# Patient Record
Sex: FEMALE | Race: WHITE | NOT HISPANIC OR LATINO | Employment: OTHER | URBAN - METROPOLITAN AREA
[De-identification: names, ages, dates, MRNs, and addresses within clinical notes are randomized per-mention and may not be internally consistent; named-entity substitution may affect disease eponyms.]

---

## 2024-07-15 ENCOUNTER — HOSPITAL ENCOUNTER (OUTPATIENT)
Dept: RADIOLOGY | Facility: HOSPITAL | Age: 49
Discharge: HOME | End: 2024-07-15
Payer: MEDICARE

## 2024-07-15 DIAGNOSIS — M79.644 PAIN IN RIGHT FINGER(S): ICD-10-CM

## 2024-07-15 PROCEDURE — 73130 X-RAY EXAM OF HAND: CPT | Mod: RIGHT SIDE | Performed by: RADIOLOGY

## 2024-07-15 PROCEDURE — 73140 X-RAY EXAM OF FINGER(S): CPT | Mod: RT

## 2024-07-15 PROCEDURE — 73140 X-RAY EXAM OF FINGER(S): CPT | Mod: RIGHT SIDE | Performed by: RADIOLOGY

## 2024-07-15 PROCEDURE — 73130 X-RAY EXAM OF HAND: CPT | Mod: RT

## 2024-07-19 ENCOUNTER — HOSPITAL ENCOUNTER (OUTPATIENT)
Dept: RADIOLOGY | Facility: HOSPITAL | Age: 49
Discharge: HOME | End: 2024-07-19
Payer: MEDICARE

## 2024-07-19 DIAGNOSIS — N18.2 CHRONIC KIDNEY DISEASE, STAGE 2 (MILD): ICD-10-CM

## 2024-07-19 PROCEDURE — 76770 US EXAM ABDO BACK WALL COMP: CPT

## 2024-09-06 ENCOUNTER — LAB (OUTPATIENT)
Dept: LAB | Facility: LAB | Age: 49
End: 2024-09-06
Payer: MEDICARE

## 2024-09-06 DIAGNOSIS — I10 ESSENTIAL (PRIMARY) HYPERTENSION: Primary | ICD-10-CM

## 2024-09-06 LAB
ALBUMIN SERPL BCP-MCNC: 4 G/DL (ref 3.4–5)
ANION GAP SERPL CALC-SCNC: 12 MMOL/L (ref 10–20)
APPEARANCE UR: CLEAR
BILIRUB UR STRIP.AUTO-MCNC: NEGATIVE MG/DL
BUN SERPL-MCNC: 26 MG/DL (ref 6–23)
CALCIUM SERPL-MCNC: 8.8 MG/DL (ref 8.6–10.3)
CHLORIDE SERPL-SCNC: 103 MMOL/L (ref 98–107)
CO2 SERPL-SCNC: 27 MMOL/L (ref 21–32)
COLOR UR: YELLOW
CREAT SERPL-MCNC: 1.02 MG/DL (ref 0.5–1.05)
CREAT UR-MCNC: 153.5 MG/DL (ref 20–320)
EGFRCR SERPLBLD CKD-EPI 2021: 67 ML/MIN/1.73M*2
GLUCOSE SERPL-MCNC: 82 MG/DL (ref 74–99)
GLUCOSE UR STRIP.AUTO-MCNC: NORMAL MG/DL
KETONES UR STRIP.AUTO-MCNC: NEGATIVE MG/DL
LEUKOCYTE ESTERASE UR QL STRIP.AUTO: NEGATIVE
NITRITE UR QL STRIP.AUTO: NEGATIVE
PH UR STRIP.AUTO: 5.5 [PH]
PHOSPHATE SERPL-MCNC: 4.8 MG/DL (ref 2.5–4.9)
POTASSIUM SERPL-SCNC: 4.5 MMOL/L (ref 3.5–5.3)
PROT UR STRIP.AUTO-MCNC: NEGATIVE MG/DL
PROT UR-ACNC: 13 MG/DL (ref 5–24)
PROT/CREAT UR: 0.08 MG/MG CREAT (ref 0–0.17)
RBC # UR STRIP.AUTO: NEGATIVE /UL
SODIUM SERPL-SCNC: 137 MMOL/L (ref 136–145)
SP GR UR STRIP.AUTO: 1.02
URATE SERPL-MCNC: 4.9 MG/DL (ref 2.3–6.7)
UROBILINOGEN UR STRIP.AUTO-MCNC: NORMAL MG/DL

## 2024-09-06 PROCEDURE — 84165 PROTEIN E-PHORESIS SERUM: CPT

## 2024-09-06 PROCEDURE — 80069 RENAL FUNCTION PANEL: CPT

## 2024-09-06 PROCEDURE — 86038 ANTINUCLEAR ANTIBODIES: CPT

## 2024-09-06 PROCEDURE — 84165 PROTEIN E-PHORESIS SERUM: CPT | Performed by: INTERNAL MEDICINE

## 2024-09-06 PROCEDURE — 86160 COMPLEMENT ANTIGEN: CPT

## 2024-09-06 PROCEDURE — 81003 URINALYSIS AUTO W/O SCOPE: CPT

## 2024-09-06 PROCEDURE — 84156 ASSAY OF PROTEIN URINE: CPT

## 2024-09-06 PROCEDURE — 36415 COLL VENOUS BLD VENIPUNCTURE: CPT

## 2024-09-06 PROCEDURE — 84550 ASSAY OF BLOOD/URIC ACID: CPT

## 2024-09-06 PROCEDURE — 84155 ASSAY OF PROTEIN SERUM: CPT

## 2024-09-06 PROCEDURE — 82570 ASSAY OF URINE CREATININE: CPT

## 2024-09-07 LAB
C3 SERPL-MCNC: 112 MG/DL (ref 87–200)
C4 SERPL-MCNC: 27 MG/DL (ref 10–50)
HOLD SPECIMEN: NORMAL
PROT SERPL-MCNC: 6.6 G/DL (ref 6.4–8.2)

## 2024-09-09 LAB — ANA SER QL HEP2 SUBST: NEGATIVE

## 2024-09-11 LAB
ALBUMIN: 4.1 G/DL (ref 3.4–5)
ALPHA 1 GLOBULIN: 0.3 G/DL (ref 0.2–0.6)
ALPHA 2 GLOBULIN: 0.6 G/DL (ref 0.4–1.1)
BETA GLOBULIN: 0.8 G/DL (ref 0.5–1.2)
GAMMA GLOBULIN: 0.8 G/DL (ref 0.5–1.4)
PATH REVIEW-SERUM PROTEIN ELECTROPHORESIS: NORMAL
PROTEIN ELECTROPHORESIS COMMENT: NORMAL

## 2024-10-08 ENCOUNTER — APPOINTMENT (OUTPATIENT)
Dept: RADIOLOGY | Facility: HOSPITAL | Age: 49
End: 2024-10-08
Payer: MEDICARE

## 2024-10-16 ENCOUNTER — HOSPITAL ENCOUNTER (EMERGENCY)
Facility: HOSPITAL | Age: 49
Discharge: HOME | End: 2024-10-16
Attending: EMERGENCY MEDICINE
Payer: MEDICARE

## 2024-10-16 ENCOUNTER — APPOINTMENT (OUTPATIENT)
Dept: RADIOLOGY | Facility: HOSPITAL | Age: 49
End: 2024-10-16
Payer: MEDICARE

## 2024-10-16 VITALS
SYSTOLIC BLOOD PRESSURE: 118 MMHG | RESPIRATION RATE: 18 BRPM | HEIGHT: 67 IN | WEIGHT: 168 LBS | DIASTOLIC BLOOD PRESSURE: 89 MMHG | OXYGEN SATURATION: 98 % | HEART RATE: 88 BPM | BODY MASS INDEX: 26.37 KG/M2 | TEMPERATURE: 97.7 F

## 2024-10-16 DIAGNOSIS — K20.90 ESOPHAGITIS: Primary | ICD-10-CM

## 2024-10-16 DIAGNOSIS — R10.10 PAIN OF UPPER ABDOMEN: ICD-10-CM

## 2024-10-16 LAB
ALBUMIN SERPL BCP-MCNC: 4.4 G/DL (ref 3.4–5)
ALP SERPL-CCNC: 65 U/L (ref 33–110)
ALT SERPL W P-5'-P-CCNC: 20 U/L (ref 7–45)
ANION GAP SERPL CALCULATED.3IONS-SCNC: 16 MMOL/L (ref 10–20)
AST SERPL W P-5'-P-CCNC: 18 U/L (ref 9–39)
BASOPHILS # BLD AUTO: 0.03 X10*3/UL (ref 0–0.1)
BASOPHILS NFR BLD AUTO: 0.5 %
BILIRUB SERPL-MCNC: 0.3 MG/DL (ref 0–1.2)
BUN SERPL-MCNC: 42 MG/DL (ref 6–23)
CALCIUM SERPL-MCNC: 9.5 MG/DL (ref 8.6–10.3)
CHLORIDE SERPL-SCNC: 102 MMOL/L (ref 98–107)
CO2 SERPL-SCNC: 22 MMOL/L (ref 21–32)
CREAT SERPL-MCNC: 1.03 MG/DL (ref 0.5–1.05)
EGFRCR SERPLBLD CKD-EPI 2021: 66 ML/MIN/1.73M*2
EOSINOPHIL # BLD AUTO: 0.07 X10*3/UL (ref 0–0.7)
EOSINOPHIL NFR BLD AUTO: 1.3 %
ERYTHROCYTE [DISTWIDTH] IN BLOOD BY AUTOMATED COUNT: 12.6 % (ref 11.5–14.5)
GLUCOSE SERPL-MCNC: 135 MG/DL (ref 74–99)
HCT VFR BLD AUTO: 39.3 % (ref 36–46)
HGB BLD-MCNC: 13.1 G/DL (ref 12–16)
IMM GRANULOCYTES # BLD AUTO: 0.02 X10*3/UL (ref 0–0.7)
IMM GRANULOCYTES NFR BLD AUTO: 0.4 % (ref 0–0.9)
LIPASE SERPL-CCNC: 29 U/L (ref 9–82)
LYMPHOCYTES # BLD AUTO: 2.28 X10*3/UL (ref 1.2–4.8)
LYMPHOCYTES NFR BLD AUTO: 41.7 %
MAGNESIUM SERPL-MCNC: 2.09 MG/DL (ref 1.6–2.4)
MCH RBC QN AUTO: 29.3 PG (ref 26–34)
MCHC RBC AUTO-ENTMCNC: 33.3 G/DL (ref 32–36)
MCV RBC AUTO: 88 FL (ref 80–100)
MONOCYTES # BLD AUTO: 0.34 X10*3/UL (ref 0.1–1)
MONOCYTES NFR BLD AUTO: 6.2 %
NEUTROPHILS # BLD AUTO: 2.73 X10*3/UL (ref 1.2–7.7)
NEUTROPHILS NFR BLD AUTO: 49.9 %
NRBC BLD-RTO: 0 /100 WBCS (ref 0–0)
PLATELET # BLD AUTO: 397 X10*3/UL (ref 150–450)
POTASSIUM SERPL-SCNC: 4.3 MMOL/L (ref 3.5–5.3)
PROT SERPL-MCNC: 7.2 G/DL (ref 6.4–8.2)
RBC # BLD AUTO: 4.47 X10*6/UL (ref 4–5.2)
SODIUM SERPL-SCNC: 136 MMOL/L (ref 136–145)
WBC # BLD AUTO: 5.5 X10*3/UL (ref 4.4–11.3)

## 2024-10-16 PROCEDURE — 74177 CT ABD & PELVIS W/CONTRAST: CPT

## 2024-10-16 PROCEDURE — 2500000001 HC RX 250 WO HCPCS SELF ADMINISTERED DRUGS (ALT 637 FOR MEDICARE OP): Performed by: EMERGENCY MEDICINE

## 2024-10-16 PROCEDURE — 2500000004 HC RX 250 GENERAL PHARMACY W/ HCPCS (ALT 636 FOR OP/ED): Performed by: EMERGENCY MEDICINE

## 2024-10-16 PROCEDURE — 83735 ASSAY OF MAGNESIUM: CPT | Performed by: EMERGENCY MEDICINE

## 2024-10-16 PROCEDURE — 2550000001 HC RX 255 CONTRASTS: Performed by: EMERGENCY MEDICINE

## 2024-10-16 PROCEDURE — 96376 TX/PRO/DX INJ SAME DRUG ADON: CPT

## 2024-10-16 PROCEDURE — 83690 ASSAY OF LIPASE: CPT | Performed by: EMERGENCY MEDICINE

## 2024-10-16 PROCEDURE — 96375 TX/PRO/DX INJ NEW DRUG ADDON: CPT

## 2024-10-16 PROCEDURE — 96374 THER/PROPH/DIAG INJ IV PUSH: CPT

## 2024-10-16 PROCEDURE — 36415 COLL VENOUS BLD VENIPUNCTURE: CPT | Performed by: EMERGENCY MEDICINE

## 2024-10-16 PROCEDURE — 74177 CT ABD & PELVIS W/CONTRAST: CPT | Performed by: RADIOLOGY

## 2024-10-16 PROCEDURE — 99284 EMERGENCY DEPT VISIT MOD MDM: CPT

## 2024-10-16 PROCEDURE — 80053 COMPREHEN METABOLIC PANEL: CPT | Performed by: EMERGENCY MEDICINE

## 2024-10-16 PROCEDURE — 85025 COMPLETE CBC W/AUTO DIFF WBC: CPT | Performed by: EMERGENCY MEDICINE

## 2024-10-16 RX ORDER — DICYCLOMINE HYDROCHLORIDE 20 MG/1
20 TABLET ORAL 4 TIMES DAILY PRN
Qty: 20 TABLET | Refills: 0 | Status: SHIPPED | OUTPATIENT
Start: 2024-10-16 | End: 2024-10-26

## 2024-10-16 RX ORDER — FAMOTIDINE 20 MG/1
20 TABLET, FILM COATED ORAL 2 TIMES DAILY
Qty: 60 TABLET | Refills: 0 | Status: SHIPPED | OUTPATIENT
Start: 2024-10-16 | End: 2024-11-15

## 2024-10-16 RX ORDER — LIDOCAINE HYDROCHLORIDE 20 MG/ML
15 SOLUTION OROPHARYNGEAL ONCE
Status: COMPLETED | OUTPATIENT
Start: 2024-10-16 | End: 2024-10-16

## 2024-10-16 RX ORDER — ONDANSETRON HYDROCHLORIDE 2 MG/ML
4 INJECTION, SOLUTION INTRAVENOUS ONCE
Status: COMPLETED | OUTPATIENT
Start: 2024-10-16 | End: 2024-10-16

## 2024-10-16 RX ORDER — ALUMINUM HYDROXIDE, MAGNESIUM HYDROXIDE, AND SIMETHICONE 1200; 120; 1200 MG/30ML; MG/30ML; MG/30ML
30 SUSPENSION ORAL ONCE
Status: COMPLETED | OUTPATIENT
Start: 2024-10-16 | End: 2024-10-16

## 2024-10-16 RX ORDER — PANTOPRAZOLE SODIUM 40 MG/10ML
40 INJECTION, POWDER, LYOPHILIZED, FOR SOLUTION INTRAVENOUS ONCE
Status: COMPLETED | OUTPATIENT
Start: 2024-10-16 | End: 2024-10-16

## 2024-10-16 RX ORDER — HYDROCODONE BITARTRATE AND ACETAMINOPHEN 5; 325 MG/1; MG/1
1 TABLET ORAL EVERY 6 HOURS PRN
Qty: 12 TABLET | Refills: 0 | Status: SHIPPED | OUTPATIENT
Start: 2024-10-16 | End: 2024-10-19

## 2024-10-16 RX ORDER — ONDANSETRON 4 MG/1
4 TABLET, ORALLY DISINTEGRATING ORAL EVERY 8 HOURS PRN
Qty: 20 TABLET | Refills: 0 | Status: SHIPPED | OUTPATIENT
Start: 2024-10-16 | End: 2024-10-23

## 2024-10-16 RX ORDER — HYDROMORPHONE HYDROCHLORIDE 1 MG/ML
1 INJECTION, SOLUTION INTRAMUSCULAR; INTRAVENOUS; SUBCUTANEOUS ONCE
Status: COMPLETED | OUTPATIENT
Start: 2024-10-16 | End: 2024-10-16

## 2024-10-16 RX ORDER — SUCRALFATE 1 G/1
1 TABLET ORAL
Qty: 120 TABLET | Refills: 11 | Status: SHIPPED | OUTPATIENT
Start: 2024-10-16 | End: 2025-10-16

## 2024-10-16 RX ORDER — DIPHENHYDRAMINE HYDROCHLORIDE 50 MG/ML
50 INJECTION INTRAMUSCULAR; INTRAVENOUS ONCE
Status: COMPLETED | OUTPATIENT
Start: 2024-10-16 | End: 2024-10-16

## 2024-10-16 ASSESSMENT — PAIN SCALES - GENERAL
PAINLEVEL_OUTOF10: 10 - WORST POSSIBLE PAIN
PAINLEVEL_OUTOF10: 8

## 2024-10-16 ASSESSMENT — PAIN DESCRIPTION - DESCRIPTORS: DESCRIPTORS: ACHING

## 2024-10-16 ASSESSMENT — PAIN DESCRIPTION - PAIN TYPE: TYPE: ACUTE PAIN

## 2024-10-16 ASSESSMENT — PAIN DESCRIPTION - FREQUENCY: FREQUENCY: CONSTANT/CONTINUOUS

## 2024-10-16 ASSESSMENT — PAIN - FUNCTIONAL ASSESSMENT: PAIN_FUNCTIONAL_ASSESSMENT: 0-10

## 2024-10-16 ASSESSMENT — PAIN DESCRIPTION - ORIENTATION: ORIENTATION: MID;UPPER

## 2024-10-16 ASSESSMENT — COLUMBIA-SUICIDE SEVERITY RATING SCALE - C-SSRS
1. IN THE PAST MONTH, HAVE YOU WISHED YOU WERE DEAD OR WISHED YOU COULD GO TO SLEEP AND NOT WAKE UP?: NO
6. HAVE YOU EVER DONE ANYTHING, STARTED TO DO ANYTHING, OR PREPARED TO DO ANYTHING TO END YOUR LIFE?: NO
2. HAVE YOU ACTUALLY HAD ANY THOUGHTS OF KILLING YOURSELF?: NO

## 2024-10-16 ASSESSMENT — PAIN DESCRIPTION - ONSET: ONSET: GRADUAL

## 2024-10-16 ASSESSMENT — PAIN DESCRIPTION - LOCATION: LOCATION: ABDOMEN

## 2024-10-16 ASSESSMENT — PAIN DESCRIPTION - PROGRESSION: CLINICAL_PROGRESSION: GRADUALLY WORSENING

## 2024-10-16 NOTE — ED PROVIDER NOTES
EMERGENCY DEPARTMENT ENCOUNTER      Pt Name: Alysha Burch  MRN: [id number]  Birthdate 1975  Date of evaluation: 10/16/2024  ED Provider: Kimmie Peterson DO     CHIEF COMPLAINT       Chief Complaint   Patient presents with    Abdominal Pain     X 4 days mid upper abd, no heart burn, hx of ulcers,diarrhea, hx of gastric bypass, no urinary issues, no fevers, no CP no SOB       HISTORY OF PRESENT ILLNESS    Alysha Burch is a 49 y.o. who presents to the emergency department with complaints of epigastric abdominal pain.  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  She has not had anything to drink since approximately October 5 of last year.  She states however these symptoms feel similar.  She cannot think of anything that brought this on.  She denies specific foods as the etiology.  She has been having nausea and vomiting as well as loose stools upon arrival to the emergency department.  The pain is in the epigastric area.  It does not radiate to her back.  She is on no regular stomach medication.  No other acute complaints.    REVIEW OF SYSTEMS     Focused ROS performed and negative other than as listed in HPI    PAST MEDICAL HISTORY     Past Medical History:   Diagnosis Date    Personal history of other diseases of the digestive system     History of esophageal reflux    Personal history of other endocrine, nutritional and metabolic disease     History of obesity    Smoker        SURGICAL HISTORY       Past Surgical History:   Procedure Laterality Date    ANKLE SURGERY  11/21/2012    Ankle Surgery    APPENDECTOMY  11/22/2013    Appendectomy    MR HEAD ANGIO WO IV CONTRAST  10/11/2018    MR HEAD ANGIO WO IV CONTRAST 10/11/2018 [facility] [place] [facility]    MR NECK ANGIO WO IV CONTRAST  10/11/2018    MR NECK ANGIO WO IV CONTRAST 10/11/2018 [facility] [place] [facility]       CURRENT MEDICATIONS       Discharge Medication List as of  "10/16/2024  7:05 PM        CONTINUE these medications which have NOT CHANGED    Details   acetaminophen (Tylenol) 325 mg tablet Take 2 tablets (650 mg) by mouth every 4 hours if needed for moderate pain (4 - 6)., Starting Wed 10/18/2023, Normal      amLODIPine (Norvasc) 10 mg tablet Take 1 tablet (10 mg) by mouth once daily., Historical Med      DULoxetine (Cymbalta) 60 mg DR capsule Take 2 capsules (120 mg) by mouth once daily. Do not crush or chew., Historical Med      gabapentin (Neurontin) 300 mg capsule Take 1 capsule (300 mg) by mouth 3 times a day., Historical Med      hydrOXYzine pamoate (Vistaril) 50 mg capsule Take 1 capsule (50 mg) by mouth 3 times a day as needed for itching or anxiety., Starting Wed 10/18/2023, Normal      loperamide (Imodium) 2 mg capsule Take 1 capsule (2 mg) by mouth 4 times a day as needed for diarrhea., Starting Wed 10/18/2023, Normal      metoprolol tartrate (Lopressor) 50 mg tablet Take 1 tablet by mouth 2 times a day., Historical Med      pantoprazole (ProtoNix) 40 mg EC tablet Take 1 tablet (40 mg) by mouth 2 times a day before meals. Do not crush, chew, or split., Starting Fri 10/20/2023, Until Tue 12/19/2023, Print      spironolactone (Aldactone) 100 mg tablet Take 1 tablet (100 mg) by mouth once daily., Historical Med             ALLERGIES     Biaxin [clarithromycin] and Zithromax [azithromycin]    FAMILY HISTORY     No family history on file.     SOCIAL HISTORY       Social History     Socioeconomic History    Marital status: Single   Tobacco Use    Smoking status: Every Day     Types: Cigarettes    Smokeless tobacco: Never   Substance and Sexual Activity    Alcohol use: Yes     Comment: pt states \"my last drink was 3-4 weeks ago\"    Drug use: Never     Social Drivers of Health     Financial Resource Strain: High Risk (10/13/2023)    Overall Financial Resource Strain (CARDIA)     Difficulty of Paying Living Expenses: Hard   Food Insecurity: No Food Insecurity (10/13/2023) "    Hunger Vital Sign     Worried About Running Out of Food in the Last Year: Never true     Ran Out of Food in the Last Year: Never true   Transportation Needs: No Transportation Needs (10/13/2023)    PRAPARE - Transportation     Lack of Transportation (Medical): No     Lack of Transportation (Non-Medical): No   Physical Activity: Unknown (10/13/2023)    Exercise Vital Sign     Days of Exercise per Week: 3 days   Stress: Stress Concern Present (10/13/2023)    [place] [employer] - Occupational Stress Questionnaire     Feeling of Stress : Very much   Social Connections: Socially Isolated (10/13/2023)    Social Connection and Isolation Panel [NHANES]     Frequency of Communication with Friends and Family: More than three times a week     Frequency of Social Gatherings with Friends and Family: More than three times a week     Attends Restorationist Services: Never     Active Member of Clubs or Organizations: No     Attends Club or Organization Meetings: Never     Marital Status:    Intimate Partner Violence: Not At Risk (10/13/2023)    Humiliation, Afraid, Rape, and Kick questionnaire     Fear of Current or Ex-Partner: No     Emotionally Abused: No     Physically Abused: No     Sexually Abused: No   Housing Stability: Low Risk  (10/13/2023)    Housing Stability Vital Sign     Unable to Pay for Housing in the Last Year: No     Number of Places Lived in the Last Year: 1     Unstable Housing in the Last Year: No       PHYSICAL EXAM       ED Triage Vitals [10/16/24 1343]   Temperature Heart Rate Respirations BP   36.5 °C (97.7 °F) (!) 115 (!) 22 114/80      Pulse Ox Temp Source Heart Rate Source Patient Position   98 % Oral Monitor Sitting      BP Location FiO2 (%)     Right arm --        General: Appears uncomfortable but in no acute distress, alert  Head: Head atraumatic; normocephalic  Eyes: normal inspection; no icterus  ENT: mucosa moist without lesion  Neck: Normal inspection, no meningeal  signs  Resp: Normal breath sounds, no wheeze or crackles; No respiratory distress  Chest Wall: no tenderness or deformity  Heart: Heart rate and rhythm regular; No Murmurs  Abdomen: Soft, moderate tenderness in the epigastric area; No distention, guarding, rigidity, or rebound  MSK: Normal appearance; Moves all extremities; No Pedal edema  Neuro: Alert; no focal deficits, moves all extremities  Psych: Mood and Affect normal  Skin: Color appropriate; warm; Dry    DIAGNOSTIC RESULTS   Lab and radiology results are independently interpreted unless noted below.  RADIOLOGY (Per Emergency Physician):     Interpretation per the Radiologist below, if available at the time of this note:  CT abdomen pelvis w IV contrast   Final Result   1.  No acute intra-abdominal abnormality.   2. Mild wall thickening of the distal esophagus. Correlate for   exclusion of esophagitis.   3. 6 mm right lower lobe pulmonary nodule, stable since comparison   imaging 10/20/2023.        MACRO:   Incidental Finding:  A solid non-calcified pulmonary nodule measuring   6-8 mm .  (**-YCF-**)        Instructions:  Consider follow up non contrast chest CT at 6-12   months, then consider CT chest at 18-24 months. (Bean Yi et   al., Guidelines for management of incidental pulmonary nodules   detected on CT images: From the Fleischner Society 2017, Radiology.   2017 Jul;284 (1):228-243.) [email or web address].[email or web address] Macro:        Signed by: Maikel Sharif 10/16/2024 5:22 PM   Dictation workstation:   [id number]          LABS:  Abnormal Labs Reviewed   COMPREHENSIVE METABOLIC PANEL - Abnormal; Notable for the following components:       Result Value    Glucose 135 (*)     Urea Nitrogen 42 (*)     All other components within normal limits       All other labs were within normal range or not returned as of this dictation.    EMERGENCY DEPARTMENT COURSE/MDM   Patient presents with complaints of epigastric pain reminiscent of prior gastritis.  On exam she is  uncomfortable but in no acute distress.  Workup is initiated.  She is provided pain and nausea medicine as well as GI cocktail.  CT scan and lipase are working as well to assess for possible pancreatitis.  She is agreeable with this plan of care.    ED Course as of 10/17/24 1430   Wed Oct 16, 2024   1628 Lab work returned showing no leukocytosis or elevated lipase.  Electrolytes are stable and there is no evidence of acute anemia.  On reassessment she states that she is feeling improved however the pain is starting to recur.  Will remedicated with pain medicine and provided dose of Protonix as well. [EF]   1806 CT scan returned showing evidence of a distal esophagitis but no other acute process.  There is an incidental pulmonary nodule which the patient states that she is aware of and states that it has not changed for years.  On reassessment patient is resting comfortably in the bed.  She is updated on her findings.  She is agreeable with plan of discharge.  Will prescribe prescriptions for a H2 blocker as the patient states she cannot take omeprazole secondary to her renal dysfunction per her nephrologist, Carafate, Bentyl and Norco for the pain as well as Zofran for nausea.  She is provided a referral to GI.  She is to return if symptoms change or worsen in any way.  She verbalized understanding and agrees with plan of discharge.  Discharged in stable condition. [EF]      ED Course User Index  [EF] Kimmie Peterson, DO         Diagnoses as of 10/17/24 1430   Esophagitis   Pain of upper abdomen       Meds Administered:  Medications   ondansetron (Zofran) injection 4 mg (4 mg intravenous Given 10/16/24 1435)   HYDROmorphone (Dilaudid) injection 1 mg (1 mg intravenous Given 10/16/24 1435)   alum-mag hydroxide-simeth (Mylanta) 200-200-20 mg/5 mL oral suspension 30 mL (30 mL oral Given 10/16/24 1435)   lidocaine (Xylocaine) 2 % mouth solution 15 mL (15 mL oral Given 10/16/24 1436)   pantoprazole (ProtoNix) injection 40  mg (40 mg intravenous Given 10/16/24 1616)   HYDROmorphone (Dilaudid) injection 0.5 mg (1 mg intravenous Given 10/16/24 1617)   iohexol (OMNIPaque) 350 mg iodine/mL solution 75 mL (75 mL intravenous Given 10/16/24 1632)   HYDROmorphone (Dilaudid) injection 1 mg (1 mg intravenous Given 10/16/24 1808)   alum-mag hydroxide-simeth (Mylanta) 200-200-20 mg/5 mL oral suspension 30 mL (30 mL oral Given 10/16/24 1808)   lidocaine (Xylocaine) 2 % mouth solution 15 mL (15 mL oral Given 10/16/24 1808)   diphenhydrAMINE (BENADryl) injection 50 mg (50 mg intravenous Given 10/16/24 1851)       PROCEDURES   Unless otherwise noted below, none  Procedures      FINAL IMPRESSION      1. Esophagitis    2. Pain of upper abdomen          DISPOSITION    Discharge 10/16/2024 05:56:55 PM    DISCHARGE   PATIENT REFERRED TO:  Laura Nichols MD  [address] [place] [id number] [phone]          Jay Pretty MD  [address] [facility] [address]  [facility] [address]  [phone]            DISCHARGE MEDICATIONS:  Discharge Medication List as of 10/16/2024  7:05 PM        START taking these medications    Details   dicyclomine (Bentyl) 20 mg tablet Take 1 tablet (20 mg) by mouth 4 times a day as needed (abdominal pain) for up to 10 days., Starting Wed 10/16/2024, Until Sat 10/26/2024 at 2359, Normal      famotidine (Pepcid) 20 mg tablet Take 1 tablet (20 mg) by mouth 2 times a day., Starting Wed 10/16/2024, Until Fri 11/15/2024, Normal      HYDROcodone-acetaminophen (Norco) 5-325 mg tablet Take 1 tablet by mouth every 6 hours if needed for severe pain (7 - 10) for up to 3 days., Starting Wed 10/16/2024, Until Sat 10/19/2024 at 2359, Normal      ondansetron ODT (Zofran-ODT) 4 mg disintegrating tablet Take 1 tablet (4 mg) by mouth every 8 hours if needed for nausea or vomiting for up to 7 days., Starting Wed 10/16/2024, Until Wed 10/23/2024 at 2359, Normal      sucralfate (Carafate) 1 gram tablet Take 1 tablet (1 g) by mouth 4 times  a day before meals., Starting Wed 10/16/2024, Until Thu 10/16/2025, Normal              The patient is discharged back to their place of residence.  Discharge diagnosis, instructions and plan were discussed and understood. At the time of discharge the patient was comfortable and was in no apparent distress. Patient is aware of diagnostic uncertainty and was notified though testing is negative here, there is a very small chance that pathology may be missed.  The patient understands these risks and the patient /family understood to return immediately to the emergency department if the symptoms worsen or if they have any additional concerns.      (Comment: Please note this report has been produced using speech recognition software and may contain errors related to that system including errors in grammar, punctuation, and spelling, as well as words and phrases that may be inappropriate.  If there are any questions or concerns please feel free to contact the dictating provider for clarification.)    Kimmie Peterson DO (electronically signed)  Emergency Medicine Physician    History Limited by: None  Independent history obtained from: None  External records reviewed: None  Diagnostics interpreted by me: None  Discussions with other clinicians: None  Chronic conditions impacting care:  prior gastritis  Social determinants of health affecting care:  former alcohol abuse currently sober x 1 year  Diagnostic tests considered but not performed: n/a  ED Medications managed:  Medications   ondansetron (Zofran) injection 4 mg (4 mg intravenous Given 10/16/24 1435)   HYDROmorphone (Dilaudid) injection 1 mg (1 mg intravenous Given 10/16/24 1435)   alum-mag hydroxide-simeth (Mylanta) 200-200-20 mg/5 mL oral suspension 30 mL (30 mL oral Given 10/16/24 1435)   lidocaine (Xylocaine) 2 % mouth solution 15 mL (15 mL oral Given 10/16/24 1436)   pantoprazole (ProtoNix) injection 40 mg (40 mg intravenous Given 10/16/24 1616)   HYDROmorphone  (Dilaudid) injection 0.5 mg (1 mg intravenous Given 10/16/24 1617)   iohexol (OMNIPaque) 350 mg iodine/mL solution 75 mL (75 mL intravenous Given 10/16/24 1632)   HYDROmorphone (Dilaudid) injection 1 mg (1 mg intravenous Given 10/16/24 1808)   alum-mag hydroxide-simeth (Mylanta) 200-200-20 mg/5 mL oral suspension 30 mL (30 mL oral Given 10/16/24 1808)   lidocaine (Xylocaine) 2 % mouth solution 15 mL (15 mL oral Given 10/16/24 1808)   diphenhydrAMINE (BENADryl) injection 50 mg (50 mg intravenous Given 10/16/24 1851)       Prescription drugs considered:  as above             Kimmie Peterson,   10/17/24 1431

## 2024-10-16 NOTE — ED NOTES
Patient c/o sudden onset of itching. No hives, or swelling of tongue or lips.     Ruth Patel RN  10/16/24 [id number]

## 2024-10-28 ENCOUNTER — APPOINTMENT (OUTPATIENT)
Dept: UROLOGY | Facility: CLINIC | Age: 49
End: 2024-10-28
Payer: MEDICARE

## 2024-11-04 ENCOUNTER — HOSPITAL ENCOUNTER (OUTPATIENT)
Dept: RADIOLOGY | Facility: HOSPITAL | Age: 49
Discharge: HOME | End: 2024-11-04
Payer: MEDICARE

## 2024-11-04 VITALS — BODY MASS INDEX: 26.68 KG/M2 | WEIGHT: 170 LBS | HEIGHT: 67 IN

## 2024-11-04 DIAGNOSIS — Z12.31 ENCOUNTER FOR SCREENING MAMMOGRAM FOR MALIGNANT NEOPLASM OF BREAST: ICD-10-CM

## 2024-11-04 PROCEDURE — 77067 SCR MAMMO BI INCL CAD: CPT | Performed by: STUDENT IN AN ORGANIZED HEALTH CARE EDUCATION/TRAINING PROGRAM

## 2024-11-04 PROCEDURE — 77067 SCR MAMMO BI INCL CAD: CPT

## 2024-11-04 PROCEDURE — 77063 BREAST TOMOSYNTHESIS BI: CPT | Performed by: STUDENT IN AN ORGANIZED HEALTH CARE EDUCATION/TRAINING PROGRAM

## 2024-11-11 ENCOUNTER — APPOINTMENT (OUTPATIENT)
Dept: RADIOLOGY | Facility: HOSPITAL | Age: 49
End: 2024-11-11
Payer: MEDICARE

## 2024-11-25 ENCOUNTER — APPOINTMENT (OUTPATIENT)
Dept: GASTROENTEROLOGY | Facility: CLINIC | Age: 49
End: 2024-11-25
Payer: MEDICARE

## 2024-12-27 ENCOUNTER — HOSPITAL ENCOUNTER (EMERGENCY)
Facility: HOSPITAL | Age: 49
Discharge: HOME | End: 2024-12-27
Payer: MEDICARE

## 2024-12-27 ENCOUNTER — APPOINTMENT (OUTPATIENT)
Dept: CARDIOLOGY | Facility: HOSPITAL | Age: 49
End: 2024-12-27
Payer: MEDICARE

## 2024-12-27 ENCOUNTER — APPOINTMENT (OUTPATIENT)
Dept: RADIOLOGY | Facility: HOSPITAL | Age: 49
End: 2024-12-27
Payer: MEDICARE

## 2024-12-27 VITALS
OXYGEN SATURATION: 100 % | SYSTOLIC BLOOD PRESSURE: 116 MMHG | WEIGHT: 158 LBS | RESPIRATION RATE: 18 BRPM | HEIGHT: 67 IN | HEART RATE: 97 BPM | BODY MASS INDEX: 24.8 KG/M2 | DIASTOLIC BLOOD PRESSURE: 78 MMHG | TEMPERATURE: 98.1 F

## 2024-12-27 DIAGNOSIS — B34.9 VIRAL ILLNESS: Primary | ICD-10-CM

## 2024-12-27 DIAGNOSIS — R06.02 SHORTNESS OF BREATH: ICD-10-CM

## 2024-12-27 LAB
ALBUMIN SERPL BCP-MCNC: 4.5 G/DL (ref 3.4–5)
ALP SERPL-CCNC: 75 U/L (ref 33–110)
ALT SERPL W P-5'-P-CCNC: 22 U/L (ref 7–45)
ANION GAP SERPL CALCULATED.3IONS-SCNC: 13 MMOL/L (ref 10–20)
AST SERPL W P-5'-P-CCNC: 26 U/L (ref 9–39)
BASOPHILS # BLD AUTO: 0.04 X10*3/UL (ref 0–0.1)
BASOPHILS NFR BLD AUTO: 0.7 %
BILIRUB SERPL-MCNC: 0.3 MG/DL (ref 0–1.2)
BNP SERPL-MCNC: 10 PG/ML (ref 0–99)
BUN SERPL-MCNC: 25 MG/DL (ref 6–23)
CALCIUM SERPL-MCNC: 9.7 MG/DL (ref 8.6–10.3)
CARDIAC TROPONIN I PNL SERPL HS: 6 NG/L (ref 0–13)
CARDIAC TROPONIN I PNL SERPL HS: 7 NG/L (ref 0–13)
CHLORIDE SERPL-SCNC: 103 MMOL/L (ref 98–107)
CO2 SERPL-SCNC: 19 MMOL/L (ref 21–32)
CREAT SERPL-MCNC: 1.07 MG/DL (ref 0.5–1.05)
EGFRCR SERPLBLD CKD-EPI 2021: 63 ML/MIN/1.73M*2
EOSINOPHIL # BLD AUTO: 0.1 X10*3/UL (ref 0–0.7)
EOSINOPHIL NFR BLD AUTO: 1.8 %
ERYTHROCYTE [DISTWIDTH] IN BLOOD BY AUTOMATED COUNT: 15.5 % (ref 11.5–14.5)
GLUCOSE SERPL-MCNC: 79 MG/DL (ref 74–99)
HCT VFR BLD AUTO: 37.5 % (ref 36–46)
HGB BLD-MCNC: 11.8 G/DL (ref 12–16)
IMM GRANULOCYTES # BLD AUTO: 0.03 X10*3/UL (ref 0–0.7)
IMM GRANULOCYTES NFR BLD AUTO: 0.5 % (ref 0–0.9)
LYMPHOCYTES # BLD AUTO: 1.62 X10*3/UL (ref 1.2–4.8)
LYMPHOCYTES NFR BLD AUTO: 29.1 %
MAGNESIUM SERPL-MCNC: 1.97 MG/DL (ref 1.6–2.4)
MCH RBC QN AUTO: 23.9 PG (ref 26–34)
MCHC RBC AUTO-ENTMCNC: 31.5 G/DL (ref 32–36)
MCV RBC AUTO: 76 FL (ref 80–100)
MONOCYTES # BLD AUTO: 0.52 X10*3/UL (ref 0.1–1)
MONOCYTES NFR BLD AUTO: 9.4 %
NEUTROPHILS # BLD AUTO: 3.25 X10*3/UL (ref 1.2–7.7)
NEUTROPHILS NFR BLD AUTO: 58.5 %
NRBC BLD-RTO: 0 /100 WBCS (ref 0–0)
PLATELET # BLD AUTO: 391 X10*3/UL (ref 150–450)
POTASSIUM SERPL-SCNC: 5.3 MMOL/L (ref 3.5–5.3)
PROT SERPL-MCNC: 8.2 G/DL (ref 6.4–8.2)
RBC # BLD AUTO: 4.94 X10*6/UL (ref 4–5.2)
SODIUM SERPL-SCNC: 130 MMOL/L (ref 136–145)
WBC # BLD AUTO: 5.6 X10*3/UL (ref 4.4–11.3)

## 2024-12-27 PROCEDURE — 71275 CT ANGIOGRAPHY CHEST: CPT | Mod: FOREIGN READ | Performed by: RADIOLOGY

## 2024-12-27 PROCEDURE — 71275 CT ANGIOGRAPHY CHEST: CPT

## 2024-12-27 PROCEDURE — 83735 ASSAY OF MAGNESIUM: CPT

## 2024-12-27 PROCEDURE — 96374 THER/PROPH/DIAG INJ IV PUSH: CPT

## 2024-12-27 PROCEDURE — 84484 ASSAY OF TROPONIN QUANT: CPT

## 2024-12-27 PROCEDURE — 99285 EMERGENCY DEPT VISIT HI MDM: CPT | Mod: 25

## 2024-12-27 PROCEDURE — 83880 ASSAY OF NATRIURETIC PEPTIDE: CPT

## 2024-12-27 PROCEDURE — 2550000001 HC RX 255 CONTRASTS

## 2024-12-27 PROCEDURE — 2500000004 HC RX 250 GENERAL PHARMACY W/ HCPCS (ALT 636 FOR OP/ED)

## 2024-12-27 PROCEDURE — 36415 COLL VENOUS BLD VENIPUNCTURE: CPT

## 2024-12-27 PROCEDURE — 80053 COMPREHEN METABOLIC PANEL: CPT

## 2024-12-27 PROCEDURE — 93005 ELECTROCARDIOGRAM TRACING: CPT

## 2024-12-27 PROCEDURE — 85025 COMPLETE CBC W/AUTO DIFF WBC: CPT

## 2024-12-27 RX ORDER — METHYLPREDNISOLONE 4 MG/1
TABLET ORAL
Qty: 21 TABLET | Refills: 0 | Status: SHIPPED | OUTPATIENT
Start: 2024-12-27 | End: 2024-12-27

## 2024-12-27 RX ORDER — ALBUTEROL SULFATE 90 UG/1
2 INHALANT RESPIRATORY (INHALATION) EVERY 4 HOURS PRN
Qty: 18 G | Refills: 0 | Status: SHIPPED | OUTPATIENT
Start: 2024-12-27 | End: 2025-01-26

## 2024-12-27 RX ORDER — METHYLPREDNISOLONE 4 MG/1
TABLET ORAL
Qty: 21 TABLET | Refills: 0 | Status: SHIPPED | OUTPATIENT
Start: 2024-12-27 | End: 2025-01-03

## 2024-12-27 RX ORDER — ALBUTEROL SULFATE 90 UG/1
2 INHALANT RESPIRATORY (INHALATION) EVERY 4 HOURS PRN
Qty: 18 G | Refills: 0 | Status: SHIPPED | OUTPATIENT
Start: 2024-12-27 | End: 2024-12-27

## 2024-12-27 RX ORDER — DEXAMETHASONE SODIUM PHOSPHATE 10 MG/ML
10 INJECTION INTRAMUSCULAR; INTRAVENOUS ONCE
Status: COMPLETED | OUTPATIENT
Start: 2024-12-27 | End: 2024-12-27

## 2024-12-27 RX ADMIN — IOHEXOL 75 ML: 350 INJECTION, SOLUTION INTRAVENOUS at 16:52

## 2024-12-27 RX ADMIN — DEXAMETHASONE SODIUM PHOSPHATE 10 MG: 10 INJECTION INTRAMUSCULAR; INTRAVENOUS at 15:55

## 2024-12-27 ASSESSMENT — PAIN - FUNCTIONAL ASSESSMENT: PAIN_FUNCTIONAL_ASSESSMENT: 0-10

## 2024-12-27 NOTE — ED PROVIDER NOTES
"HPI   Chief Complaint   Patient presents with    Shortness of Breath     Covid positive states had medicine for it but it did nothing. States cannot breath       Patient is a 49-year-old female presenting to the emergency department for evaluation of cough, congestion, and shortness of breath.  Patient states 7 days ago she tested positive for COVID-19.  She states she has since had cough, congestion, and shortness of breath.  She states that she was prescribed Paxlovid and just finished it yesterday however symptoms have not improved.  She states she gets very winded walking up and down stairs.  She states nothing makes the symptoms better.  She denies any chest pain, fevers, chills, nausea, vomiting, abdominal pain.  She denies any recent travel or sick contacts.  She denies any history of PE/DVT.              Patient History   Past Medical History:   Diagnosis Date    Personal history of other diseases of the digestive system     History of esophageal reflux    Personal history of other endocrine, nutritional and metabolic disease     History of obesity    Smoker      Past Surgical History:   Procedure Laterality Date    ANKLE SURGERY  11/21/2012    Ankle Surgery    APPENDECTOMY  11/22/2013    Appendectomy    MR HEAD ANGIO WO IV CONTRAST  10/11/2018    MR HEAD ANGIO WO IV CONTRAST 10/11/2018 [facility] [place] EMERGENCY LEGACY    MR NECK ANGIO WO IV CONTRAST  10/11/2018    MR NECK ANGIO WO IV CONTRAST 10/11/2018 [facility] [place] [facility]     No family history on file.  Social History     Tobacco Use    Smoking status: Every Day     Types: Cigarettes    Smokeless tobacco: Never   Substance Use Topics    Alcohol use: Yes     Comment: pt states \"my last drink was 3-4 weeks ago\"    Drug use: Never       Physical Exam   ED Triage Vitals [12/27/24 1513]   Temperature Heart Rate Respirations BP   36.7 °C (98.1 °F) 97 18 116/78      Pulse Ox Temp Source Heart Rate Source Patient Position   100 % Oral Monitor Sitting      BP Location FiO2 " (%)     Right arm --       Physical Exam  Vitals and nursing note reviewed.   Constitutional:       General: She is not in acute distress.     Appearance: She is well-developed. She is not ill-appearing or toxic-appearing.   HENT:      Head: Normocephalic and atraumatic.   Eyes:      Pupils: Pupils are equal, round, and reactive to light.   Cardiovascular:      Rate and Rhythm: Normal rate and regular rhythm.      Heart sounds: No murmur heard.     No friction rub. No gallop.   Pulmonary:      Effort: Pulmonary effort is normal.      Breath sounds: No decreased breath sounds, wheezing, rhonchi or rales.   Abdominal:      Palpations: Abdomen is soft.      Tenderness: There is no abdominal tenderness.   Musculoskeletal:         General: Normal range of motion.      Cervical back: Normal range of motion.      Right lower leg: No edema.      Left lower leg: No edema.   Skin:     General: Skin is warm and dry.   Neurological:      General: No focal deficit present.      Mental Status: She is alert and oriented to person, place, and time.   Psychiatric:         Mood and Affect: Mood normal.         Behavior: Behavior normal.           ED Course & MDM   Diagnoses as of 01/21/25 1618   Viral illness   Shortness of breath                 No data recorded     [place] Coma Scale Score: 15 (12/27/24 1518 : Sheila Hussein RN)                           Medical Decision Making  **Disclaimer parts of this chart have been completed using voice recognition software. Please excuse any errors of transcription.     Evaluated this patient independently and my supervising physician was available for consultation.    HPI: Detailed above.    Exam: A medically appropriate exam performed, outlined above, given the known history and presentation.    History obtained from: Patient    EKG: Reviewed and interpreted by my attending physician    Labs/Diagnostics:  Labs Reviewed   CBC WITH AUTO DIFFERENTIAL - Abnormal       Result Value    WBC 5.6       nRBC 0.0      RBC 4.94      Hemoglobin 11.8 (*)     Hematocrit 37.5      MCV 76 (*)     MCH 23.9 (*)     MCHC 31.5 (*)     RDW 15.5 (*)     Platelets 391      Neutrophils % 58.5      Immature Granulocytes %, Automated 0.5      Lymphocytes % 29.1      Monocytes % 9.4      Eosinophils % 1.8      Basophils % 0.7      Neutrophils Absolute 3.25      Immature Granulocytes Absolute, Automated 0.03      Lymphocytes Absolute 1.62      Monocytes Absolute 0.52      Eosinophils Absolute 0.10      Basophils Absolute 0.04     COMPREHENSIVE METABOLIC PANEL - Abnormal    Glucose 79      Sodium 130 (*)     Potassium 5.3      Chloride 103      Bicarbonate 19 (*)     Anion Gap 13      Urea Nitrogen 25 (*)     Creatinine 1.07 (*)     eGFR 63      Calcium 9.7      Albumin 4.5      Alkaline Phosphatase 75      Total Protein 8.2      AST 26      Bilirubin, Total 0.3      ALT 22     MAGNESIUM - Normal    Magnesium 1.97     B-TYPE NATRIURETIC PEPTIDE - Normal    BNP 10      Narrative:        <100 pg/mL - Heart failure unlikely  100-299 pg/mL - Intermediate probability of acute heart                  failure exacerbation. Correlate with clinical                  context and patient history.    >=300 pg/mL - Heart Failure likely. Correlate with clinical                  context and patient history.    BNP testing is performed using different testing methodology at [facility] than at other [facility] hospitals. Direct result comparisons should only be made within the same method.      SERIAL TROPONIN-INITIAL - Normal    Troponin I, High Sensitivity 7      Narrative:     Less than 99th percentile of normal range cutoff-  Female and children under 18 years old <14 ng/L; Male <21 ng/L: Negative  Repeat testing should be performed if clinically indicated.     Female and children under 18 years old 14-50 ng/L; Male 21-50 ng/L:  Consistent with possible cardiac damage and possible increased clinical   risk. Serial measurements may help  to assess extent of myocardial damage.     >50 ng/L: Consistent with cardiac damage, increased clinical risk and  myocardial infarction. Serial measurements may help assess extent of   myocardial damage.      NOTE: Children less than 1 year old may have higher baseline troponin   levels and results should be interpreted in conjunction with the overall   clinical context.     NOTE: Troponin I testing is performed using a different   testing methodology at [facility] than at other   [facility]. Direct result comparisons should only   be made within the same method.   SERIAL TROPONIN, 1 HOUR - Normal    Troponin I, High Sensitivity 6      Narrative:     Less than 99th percentile of normal range cutoff-  Female and children under 18 years old <14 ng/L; Male <21 ng/L: Negative  Repeat testing should be performed if clinically indicated.     Female and children under 18 years old 14-50 ng/L; Male 21-50 ng/L:  Consistent with possible cardiac damage and possible increased clinical   risk. Serial measurements may help to assess extent of myocardial damage.     >50 ng/L: Consistent with cardiac damage, increased clinical risk and  myocardial infarction. Serial measurements may help assess extent of   myocardial damage.      NOTE: Children less than 1 year old may have higher baseline troponin   levels and results should be interpreted in conjunction with the overall   clinical context.     NOTE: Troponin I testing is performed using a different   testing methodology at [facility] than at other   [facility]. Direct result comparisons should only   be made within the same method.   TROPONIN SERIES- (INITIAL, 1 HR)    Narrative:     The following orders were created for panel order Troponin I Series, High Sensitivity (0, 1 HR).  Procedure                               Abnormality         Status                     ---------                               -----------         ------                      Troponin I, High Sensiti...[[id number]]  Normal              Final result               Troponin, High Sensitivi...[[id number]]  Normal              Final result                 Please view results for these tests on the individual orders.     CT angio chest for pulmonary embolism   Final Result   1.  No evidence of a pulmonary embolus.   2.  Subcentimeter nodules bilaterally.  If there is a history of   smoking or primary carcinoma, follow-up chest CT scan is recommended   in one year as per the Fleischner criteria.   3.  Mild right hilar and mediastinal adenopathy, of indeterminate   etiology.   4.  Coronary artery calcifications.   Signed by Richard Jones MD        EMERGENCY DEPARTMENT COURSE and DIFFERENTIAL DIAGNOSIS/MDM:  Patient is a 49-year-old female presenting to the emergency department for evaluation of shortness of breath.  On physical exam vital signs stable patient is in no acute distress.  Patient is satting at 100% on room air.  Lung sounds clear to auscultation bilaterally.  She has no increased work of breathing.  Patient states she did test positive for COVID-19 7 days ago and therefore concern for possible PE and therefore CT angio chest ordered.  Initial troponin 7 and repeat troponin 6 therefore low suspicion for ACS/CAD.  BNP normal therefore low suspicion for CHF.  CMP remarkable for creatinine of 1.07 which is consistent with previous labs.  Sodium of 130 but otherwise no further electrolyte abnormalities.  Magnesium normal.  CBC showed no leukocytosis with a hemoglobin of 11.8.  CT angio of the chest showed no evidence of pulmonary embolism with some subcentimeter nodules consistent with patient's smoking history and mild right hilar and mediastinal adenopathy with coronary artery calcifications.  There is no evidence of pneumonia.  At this time suspect patient symptoms are likely due to the viral illness.  I told the patient that Paxlovid does not always help with symptoms.   "Ambulatory pulse ox was performed and patient passed without difficulty.  At this time I feel patient can be discharged in stable condition.  She is continuing satting 100% on room air with no increased work of breathing.  She will follow-up with primary care physician outpatient within the next 1 to 2 days.  She will return to the emergency department with any new or worsening symptoms.  She was given a prescription for Medrol Dosepak to help with any inflammation of the lungs as well as albuterol inhaler.    The patient presented with a chief complaint of shortness of breath. The differential diagnosis associated with this patient's presentation includes cough, asthma, atrial fibrillation, congestive heart failure, pulmonary edema, acute MI, pneumonia, pneumothorax, pulmonary embolus, sepsis, SIRS, URI, bronchitis, foreign body aspiration..     Vitals:    Vitals:    12/27/24 1513   BP: 116/78   BP Location: Right arm   Patient Position: Sitting   Pulse: 97   Resp: 18   Temp: 36.7 °C (98.1 °F)   TempSrc: Oral   SpO2: 100%   Weight: 71.7 kg (158 lb)   Height: 1.702 m (5' 7\")     History Limited by:    None    Independent history obtained from:    None    External records reviewed:    None    Diagnostics interpreted by me:    CT Scan(s) see MDM    Discussions with other clinicians:    None    Chronic conditions impacting care:    None    Social determinants of health affecting care:    None    Diagnostic tests considered but not performed: None    ED Medications managed:    Medications   dexAMETHasone (Decadron) injection 10 mg (10 mg intravenous Given 12/27/24 2125)   iohexol (OMNIPaque) 350 mg iodine/mL solution 75 mL (75 mL intravenous Given 12/27/24 1652)       Prescription drugs considered:     Medrol Dosepak and albuterol inhaler    Screenings:              Procedure  Procedures     Tram Mcgee PA-C  12/27/24 6839       Tram Mcgee PA-C  01/21/25 1620    "

## 2024-12-27 NOTE — DISCHARGE INSTRUCTIONS
Thank you for choosing [facility] and [facility] for your care today. Please follow up as indicated as continued care and treatment is a very important part of your care today. Please return to this or any Emergency Department if you have any new or worsening symptoms.

## 2024-12-27 NOTE — Clinical Note
Alysha Burch was seen and treated in our emergency department on 12/27/2024.  She may return to work on 12/30/2024.       If you have any questions or concerns, please don't hesitate to call.      Tram Mcgee PA-C

## 2024-12-30 LAB
ATRIAL RATE: 87 BPM
P AXIS: 33 DEGREES
P OFFSET: 187 MS
P ONSET: 143 MS
PR INTERVAL: 144 MS
Q ONSET: 215 MS
QRS COUNT: 15 BEATS
QRS DURATION: 80 MS
QT INTERVAL: 372 MS
QTC CALCULATION(BAZETT): 447 MS
QTC FREDERICIA: 420 MS
R AXIS: 75 DEGREES
T AXIS: 48 DEGREES
T OFFSET: 401 MS
VENTRICULAR RATE: 87 BPM

## 2025-01-31 ENCOUNTER — HOSPITAL ENCOUNTER (EMERGENCY)
Facility: HOSPITAL | Age: 50
Discharge: HOME | End: 2025-01-31
Attending: EMERGENCY MEDICINE
Payer: MEDICARE

## 2025-01-31 ENCOUNTER — APPOINTMENT (OUTPATIENT)
Dept: RADIOLOGY | Facility: HOSPITAL | Age: 50
End: 2025-01-31
Payer: MEDICARE

## 2025-01-31 VITALS
TEMPERATURE: 97.2 F | WEIGHT: 165 LBS | OXYGEN SATURATION: 98 % | DIASTOLIC BLOOD PRESSURE: 86 MMHG | HEIGHT: 67 IN | RESPIRATION RATE: 18 BRPM | BODY MASS INDEX: 25.9 KG/M2 | SYSTOLIC BLOOD PRESSURE: 108 MMHG | HEART RATE: 95 BPM

## 2025-01-31 DIAGNOSIS — G89.29 ACUTE EXACERBATION OF CHRONIC LOW BACK PAIN: ICD-10-CM

## 2025-01-31 DIAGNOSIS — M54.50 ACUTE EXACERBATION OF CHRONIC LOW BACK PAIN: ICD-10-CM

## 2025-01-31 DIAGNOSIS — S39.012A LUMBAR STRAIN, INITIAL ENCOUNTER: Primary | ICD-10-CM

## 2025-01-31 DIAGNOSIS — G89.29 CHRONIC BACK PAIN, UNSPECIFIED BACK LOCATION, UNSPECIFIED BACK PAIN LATERALITY: ICD-10-CM

## 2025-01-31 DIAGNOSIS — M54.9 CHRONIC BACK PAIN, UNSPECIFIED BACK LOCATION, UNSPECIFIED BACK PAIN LATERALITY: ICD-10-CM

## 2025-01-31 DIAGNOSIS — I10 DIASTOLIC HYPERTENSION: ICD-10-CM
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
## 2025-01-31 PROCEDURE — 72131 CT LUMBAR SPINE W/O DYE: CPT

## 2025-01-31 PROCEDURE — 2500000005 HC RX 250 GENERAL PHARMACY W/O HCPCS

## 2025-01-31 PROCEDURE — 72125 CT NECK SPINE W/O DYE: CPT

## 2025-01-31 PROCEDURE — 99284 EMERGENCY DEPT VISIT MOD MDM: CPT | Mod: 25 | Performed by: EMERGENCY MEDICINE

## 2025-01-31 PROCEDURE — 70450 CT HEAD/BRAIN W/O DYE: CPT

## 2025-01-31 PROCEDURE — 96372 THER/PROPH/DIAG INJ SC/IM: CPT

## 2025-01-31 PROCEDURE — 2500000001 HC RX 250 WO HCPCS SELF ADMINISTERED DRUGS (ALT 637 FOR MEDICARE OP)

## 2025-01-31 PROCEDURE — 2500000004 HC RX 250 GENERAL PHARMACY W/ HCPCS (ALT 636 FOR OP/ED)

## 2025-01-31 RX ORDER — ORPHENADRINE CITRATE 30 MG/ML
60 INJECTION INTRAMUSCULAR; INTRAVENOUS ONCE
Status: COMPLETED | OUTPATIENT
Start: 2025-01-31 | End: 2025-01-31

## 2025-01-31 RX ORDER — OXYCODONE AND ACETAMINOPHEN 5; 325 MG/1; MG/1
1 TABLET ORAL ONCE
Status: COMPLETED | OUTPATIENT
Start: 2025-01-31 | End: 2025-01-31

## 2025-01-31 RX ORDER — LIDOCAINE 560 MG/1
1 PATCH PERCUTANEOUS; TOPICAL; TRANSDERMAL ONCE
Status: DISCONTINUED | OUTPATIENT
Start: 2025-01-31 | End: 2025-01-31 | Stop reason: HOSPADM

## 2025-01-31 RX ADMIN — OXYCODONE HYDROCHLORIDE AND ACETAMINOPHEN 1 TABLET: 5; 325 TABLET ORAL at 12:05

## 2025-01-31 RX ADMIN — LIDOCAINE 1 PATCH: 4 PATCH TOPICAL at 12:05

## 2025-01-31 RX ADMIN — ORPHENADRINE CITRATE 60 MG: 60 INJECTION INTRAMUSCULAR; INTRAVENOUS at 12:05

## 2025-01-31 ASSESSMENT — PAIN SCALES - GENERAL: PAINLEVEL_OUTOF10: 10 - WORST POSSIBLE PAIN

## 2025-01-31 ASSESSMENT — PAIN - FUNCTIONAL ASSESSMENT: PAIN_FUNCTIONAL_ASSESSMENT: 0-10

## 2025-01-31 ASSESSMENT — PAIN DESCRIPTION - LOCATION: LOCATION: BACK

## 2025-01-31 ASSESSMENT — PAIN DESCRIPTION - ORIENTATION: ORIENTATION: UPPER

## 2025-01-31 ASSESSMENT — PAIN DESCRIPTION - PAIN TYPE: TYPE: ACUTE PAIN

## 2025-01-31 NOTE — ED TRIAGE NOTES
Patient arrived to the ED from home via personal vehicle with a chief complaint of upper back/neck pain. Patient reports hx of chronic back/neck pain but fell 2 days ago. Pain radiates from upper back to right leg. Patient transported by wheelchair into triage, A&Ox4.

## 2025-01-31 NOTE — ED PROVIDER NOTES
"HPI   Chief Complaint   Patient presents with    Back Pain       Patient is a 49-year-old female presenting to the emergency department for evaluation of low back pain.  Patient states approximately 1 week ago her right leg gave out and she fell to the ground.  She states since then she has had right-sided low back pain that radiates into her right lower extremity.  She states she has been taking gabapentin at home with no relief in symptoms.  She states she can barely move due to the pain.  She states she is unable to walk or go up and down stairs due to the pain.  She denies any bowel or bladder incontinence.  She denies any saddle anesthesias.  She denies any numbness or tingling down the bilateral lower extremities.              Patient History   Past Medical History:   Diagnosis Date    Personal history of other diseases of the digestive system     History of esophageal reflux    Personal history of other endocrine, nutritional and metabolic disease     History of obesity    Smoker      Past Surgical History:   Procedure Laterality Date    ANKLE SURGERY  11/21/2012    Ankle Surgery    APPENDECTOMY  11/22/2013    Appendectomy    MR HEAD ANGIO WO IV CONTRAST  10/11/2018    MR HEAD ANGIO WO IV CONTRAST 10/11/2018 [facility] [place]    MR NECK ANGIO WO IV CONTRAST  10/11/2018    MR NECK ANGIO WO IV CONTRAST 10/11/2018 [facility] [place]     No family history on file.  Social History     Tobacco Use    Smoking status: Every Day     Types: Cigarettes    Smokeless tobacco: Never   Substance Use Topics    Alcohol use: Yes     Comment: pt states \"my last drink was 3-4 weeks ago\"    Drug use: Never       Physical Exam   ED Triage Vitals [01/31/25 1131]   Temperature Heart Rate Respirations BP   36.2 °C (97.2 °F) 95 18 108/86      Pulse Ox Temp Source Heart Rate Source Patient Position   98 % Temporal -- --      BP Location FiO2 (%)     -- --       Physical Exam  Vitals and nursing note reviewed.   Constitutional:     "   General: She is not in acute distress.     Appearance: Normal appearance. She is not ill-appearing or toxic-appearing.   HENT:      Head: Normocephalic and atraumatic.      Nose: Nose normal.      Mouth/Throat:      Mouth: Mucous membranes are moist.   Eyes:      Extraocular Movements: Extraocular movements intact.      Pupils: Pupils are equal, round, and reactive to light.   Cardiovascular:      Rate and Rhythm: Normal rate and regular rhythm.      Pulses: Normal pulses.   Pulmonary:      Effort: Pulmonary effort is normal.      Breath sounds: Normal breath sounds. No wheezing, rhonchi or rales.   Abdominal:      Palpations: Abdomen is soft.      Tenderness: There is no abdominal tenderness. There is no guarding or rebound.   Musculoskeletal:         General: Tenderness (Left lumbar paraspinal and sciatic notch.  Positive straight leg test on the right.) present.      Cervical back: Normal range of motion. No tenderness.   Skin:     General: Skin is warm and dry.   Neurological:      General: No focal deficit present.      Mental Status: She is alert and oriented to person, place, and time.      Sensory: No sensory deficit (Normal sensation in the bilateral lower extremities).      Motor: No weakness.   Psychiatric:         Mood and Affect: Mood normal.         Behavior: Behavior normal.           ED Course & MDM   Diagnoses as of 01/31/25 1441   Lumbar strain, initial encounter   Acute exacerbation of chronic low back pain   Chronic back pain, unspecified back location, unspecified back pain laterality   Fall, initial encounter   Diastolic hypertension                 No data recorded     [place] Coma Scale Score: 15 (01/31/25 1134 : Delfina Guerra RN)                           Medical Decision Making  **Disclaimer parts of this chart have been completed using voice recognition software. Please excuse any errors of transcription.     Patient seen in conjunction with attending physician Dr. Germain.     HPI:  Detailed above.    Exam: A medically appropriate exam performed, outlined above, given the known history and presentation.    History obtained from: Patient    Labs/Diagnostics:  CT head wo IV contrast   Final Result   No evidence of acute cortical infarct or intracranial hemorrhage.        No evidence of intracranial hemorrhage or displaced skull fracture.        MACRO:   None.        Signed by: Kassidy Saenz 1/31/2025 1:44 PM   Dictation workstation:   [id number]      CT cervical spine wo IV contrast   Final Result   No acute fracture or subluxation is noted        Focal severe facet arthropathy on the left at C3-4 has progressed   since the prior study and is associated with foraminal stenosis        MACRO:   None        Signed by: Kassidy Saenz 1/31/2025 1:47 PM   Dictation workstation:   [id number]      CT lumbar spine wo IV contrast   Final Result   No fracture is noted        Degenerative changes are similar to the prior study. There are   several levels of mild-to-moderate lateral recess narrowing related   to disc bulges and posterior hypertrophy.        MACRO:   None        Signed by: Kassidy Saenz 1/31/2025 1:53 PM   Dictation workstation:   [id number]        EMERGENCY DEPARTMENT COURSE and DIFFERENTIAL DIAGNOSIS/MDM:  Patient is a 49-year-old female presenting to the emergency department for evaluation of low back pain.  On physical exam vital signs stable and patient is in no acute distress.  Patient has positive straight leg test on the right with normal sensation down the bilateral lower extremities.  CT of the lumbar spine ordered to rule out any fracture or abnormalities.  Patient spoke with attending physician regarding her symptoms and was also complaining of some neck pain and does admit to previous neck fracture and therefore CT of the head and cervical spine orders were placed.  Patient given IM Norflex, p.o. Percocet, and lidocaine patch for pain.  CT of the head showed no evidence  "of acute cortical infarct or intracranial hemorrhage.  CT of the cervical spine showed no acute fracture or subluxation with focal severe facet arthropathy on the left at C3-4 which has progressed from prior study.  CT of the lumbar spine showed no fracture but degenerative changes similar to prior study.  At this time suspect patient's pain is likely due to a low back strain.  Patient did get some pain relief with the medications given.  She was advised to follow-up outpatient with primary care physician as well as pain management.  She will return to the emergency department with any new or worsening symptoms.  Low suspicion for any cauda equina or epidural abscess given that patient does not have any red flag signs.    The patient presented with a chief complaint of low back pain. The differential diagnosis associated with this patient's presentation includes sciatica, musculoskeletal strain, cervical strain, minor concussion, cauda equina, epidural abscess.     Vitals:    Vitals:    01/31/25 1131   BP: 108/86   Pulse: 95   Resp: 18   Temp: 36.2 °C (97.2 °F)   TempSrc: Temporal   SpO2: 98%   Weight: 74.8 kg (165 lb)   Height: 1.702 m (5' 7\")     History Limited by:    None    Independent history obtained from:    None    External records reviewed:    None    Diagnostics interpreted by me:    CT Scan(s) see MDM    Discussions with other clinicians:    None    Chronic conditions impacting care:    None    Social determinants of health affecting care:    None    Diagnostic tests considered but not performed: None    ED Medications managed:    Medications   lidocaine 4 % patch 1 patch (1 patch transdermal Medication Applied 1/31/25 1205)   oxyCODONE-acetaminophen (Percocet) 5-325 mg per tablet 1 tablet (1 tablet oral Given 1/31/25 1205)   orphenadrine (Norflex) injection 60 mg (60 mg intramuscular Given 1/31/25 1205)       Prescription drugs considered:    None    Screenings:              Procedure  Procedures   "   Tram Mcgee PA-C  01/31/25 0506

## 2025-01-31 NOTE — DISCHARGE INSTRUCTIONS
Follow-up with your primary care physician within 1 to 2 days for further management of your current symptoms, repeat check of your blood pressure, and to discuss possible referral to physical therapy.      Follow-up with your pain management provider within 1 to 2 days for further management of your current symptoms      Return to the emergency department sooner with worsening of symptoms or onset of new symptoms

## 2025-01-31 NOTE — PROGRESS NOTES
Attestation/Supervisory note for JINNY Mcgee      The patient is a 49-year-old female presenting to the emergency department for evaluation of neck pain and lower back pain.  The patient states that she does have a history of chronic pain syndrome and is managed by pain management provider.  She states that she had a remote injury and is on chronic disability because of her symptoms.  She states that sometimes her right leg gives out on her because of her chronic symptoms.  She states that this occurred about a week ago.  She states that she fell on the steps.  She states that she did have carpeted steps.  She states that she has been taking her gabapentin at home with no improvement in her symptoms.  She states that the pain is primarily in the right side of her lower back and radiates into her right leg.  She states that she also has pain in her upper neck but it is a chronic issue.  She states that she feels like she was just spencer during the fall.  She does not feel that she had any loss of consciousness or that she hit her head directly.  She denies any bowel or bladder incontinence.  No urinary retention.  No history of IV drug abuse.  No history of malignancy.  She states that she was having difficulty walking up and down the steps because of the pain.  She states that she was able to get in her car and drive herself to the hospital today.  She states that she is planning on following up with her pain management provider because he takes care of all of her needs and can arrange for physical therapy but she want to come to the emergency room to make sure nothing was broken.  No headache or visual changes.  No chest pain or shortness of breath.  No abdominal pain.  No nausea or vomiting.  No diarrhea or constipation.  No urinary complaints.  No vaginal discharge.  No use of blood thinners.  All pertinent positives and negatives are recorded above.  All other systems reviewed and otherwise negative.  Vital signs  with diastolic hypertension but otherwise within normal limits.  Physical exam with a well-nourished well-developed female in no acute distress.  HEENT exam within normal limits.  She has no evidence of airway compromise or respiratory distress.  Abdominal exam is benign.  She does not have any gross motor, neurologic or vascular episodes on exam.  Pulses are equal bilaterally.  She was able to walk and  the emergency department.      Lidoderm patch, IM Norflex and oral Percocet ordered for symptom relief.  The patient states that she will have someone come and pick her up and give her a ride home.      CT head wo IV contrast   Final Result   No evidence of acute cortical infarct or intracranial hemorrhage.        No evidence of intracranial hemorrhage or displaced skull fracture.        MACRO:   None.        Signed by: Kassidy Saenz 1/31/2025 1:44 PM   Dictation workstation:   [id number]      CT cervical spine wo IV contrast   Final Result   No acute fracture or subluxation is noted        Focal severe facet arthropathy on the left at C3-4 has progressed   since the prior study and is associated with foraminal stenosis        MACRO:   None        Signed by: Kassidy Saenz 1/31/2025 1:47 PM   Dictation workstation:   [id number]      CT lumbar spine wo IV contrast   Final Result   No fracture is noted        Degenerative changes are similar to the prior study. There are   several levels of mild-to-moderate lateral recess narrowing related   to disc bulges and posterior hypertrophy.        MACRO:   None        Signed by: Kassidy Saenz 1/31/2025 1:53 PM   Dictation workstation:   [id number]           The patient does not have any gross motor, neurologic or vascular episodes on exam.  Pulses are equal bilaterally.  She does not have any evidence of hemodynamic instability.  No evidence of fracture or dislocation on CT head.  No evidence of fracture or dislocation on CT C-spine and/or CT of the  L-spine.  She does have a degenerative changes consistent with prior studies.      The patient was released in good condition.  She was instructed to follow-up with her primary care physician within 1 to 2 days for further management of her current symptoms, repeat check of her blood pressure, and to arrange for outpatient physical therapy.  She states that she will follow-up with her pain management prior as he does all this care for her.  She will continue to take medications as prescribed by her pain management provider.  She will return to the emergency department sooner with worsening of symptoms or onset of new symptoms. the patient was offered a prescription for nonnarcotic pain medication declined it.  She requested opioid pain medication.  I did direct her to her pain management provider for their recommendation regarding controlled substance prescriptions to control her chronic pain.      Impression/diagnosis:  1.  Fall from standing height, initial encounter  2.  Chronic pain syndrome  3.  Low back pain, acute on chronic  4.  Diastolic hypertension      I personally saw the patient and made/approve the management plan and take responsibility for the patient management.      I personally discussed the patient's management with the patient      I reviewed the results of the diagnostic imaging.  Formal radiology read was completed by the radiologist.      Renuka Germain MD

## 2025-01-31 NOTE — Clinical Note
Alysha Burch was seen and treated in our emergency department on 1/31/2025.  She may return to work on 02/01/2025.  No heavy lifting, bending, straining     If you have any questions or concerns, please don't hesitate to call.      Renuka Germain MD

## 2025-02-24 ENCOUNTER — EVALUATION (OUTPATIENT)
Dept: PHYSICAL THERAPY | Facility: CLINIC | Age: 50
End: 2025-02-24
Payer: MEDICARE

## 2025-02-24 DIAGNOSIS — M54.16 RADICULOPATHY, LUMBAR REGION: Primary | ICD-10-CM

## 2025-02-24 PROCEDURE — 97162 PT EVAL MOD COMPLEX 30 MIN: CPT | Mod: GP

## 2025-02-24 NOTE — PROGRESS NOTES
Physical Therapy Evaluation    Patient Name: Alysha Burch  MRN: [id number]  Evaluation Date: 2/24/2025  Time Calculation  Start Time: 0734  Stop Time: 0814  Time Calculation (min): 40 min  PT Evaluation Time Entry  PT Evaluation (Moderate) Time Entry: 37          Problem List Items Addressed This Visit             ICD-10-CM    Radiculopathy, lumbar region - Primary M54.16    Relevant Orders    Follow Up In Physical Therapy        Subjective   General:  General  Referred By: Dr Macias    Patient reported hx of injury:   H/o broken neck and Lumbar Laminectomy done 2017.  LBP since.  About 3 weeks ago insidious onset of increased pain and went to ER- CT scan.  Pt saw Dr Macias who did injection (2 day relief) and having MRI on Friday and to OPPT.      Precautions:   None    Relevant PMH:  OA, HTN, Migraines, HA, h/o anemia and ulcers    Red flags: Do you have any of the following? No  Fever/chills, unexplained weight changes, dizziness/fainting, unexplained change in bowel or bladder functions, unexplained malaise or muscle weakness, night pain/sweats, numbness or tingling    Pain:     Pain description: constant intense sharp pain from R LS area down lateral R hip to anterolateral R knee. At times pain goes to lateral R ankle.  Achiness across B LB all the time. Intermittent numbness ant R shin    Pain at present: 8/10 now seated  Maximum pain: 10/10  Minimum pain: 8/10    Pain improves with: lying supine with ice, meds  Pain worsens with: standing > 5 min, walking >200 feet, arching back, lifting, stairs    Home Living:     Home type: House with 2 steps to enter and 1 flight to 2nd floor with rail  Occupation: part time job doing nutrition services  and [employer]  Work tasks: sits, stands (rare lifitng)    Prior Function Per Pt/Caregiver Report:   ADLs have been limited by pain for years however more significantly in past month    Objective   Posture:   Ant head posture with forward rounded shoulders. Pt stands  23-Sep-2024 06:43 with trunk forward flexed 10 degrees.    Range of Motion:     Lumbar ROM Range   Flexion Wnl with R LE pain with return to erect   Extension -10 deg   R rotation Mod negrete with pain   L rotation Mod negrete with pain    B LE AROM is wfl.    Strength:     LE MMT L R   Hip flexion 3/5 with pn 3-/5 with pain   Hip abduction 3+/5 with pain 3-/5 with pain   Hip adduction 3+/5 3+/5   Knee flexion 4/5 4-/5   Knee extension 4/5 3-/5   Ankle DF 4/5 3-/5      Core strength/stability:  Fair-    Flexibility:   Tight hamstrings    Palpation:   Pt very tender at R sacral base and along R sacrum.  Pt tender B SI and LS psp    Special Tests:     Straight leg raise test:  negative bilaterally  Slump:  negative on R, Positive on L    Gait:   Decreased heel strike and stride length bilaterally with trunk forward flexed    Transfers:   1-2 UE assist for sit to stand    Assessment       Pt is a 49 y.o. female who presents with impairments of LBP with R radiculopathy, muscle weakness and limited flexibility. These impairments have led to functional limitations including tolerance to ADLs including standing, walking, lifting and climbing stairs. Pt would benefit from skilled physical therapy intervention to improve above impairments and facilitate return to function.    Complexity of Evaluation: Moderate    Based on the history including personal factors and/or comorbidities, examination of body systems including body structures and function, activity limitations, and/or participation restrictions, as well as clinical presentation, patient meets criteria for a Moderate complexity evaluation.    Plan  Treatment/Interventions: Aquatic therapy, Education/ Instruction, Electrical stimulation, Manual therapy, Neuromuscular re-education, Therapeutic activities, Therapeutic exercises, Ultrasound  PT Plan: Skilled PT  PT Frequency:  (2x/week for 10 visits)  Certification Period Start Date: 02/24/25  Certification Period End Date: 05/25/25  Number of  Treatments Authorized: NO AUTH, 40.00 CO PAY, 100% COVERAGE, [place], 4500 OOP, [employer] [facility]  Rehab Potential: Good  Plan of Care Agreement: Patient    Insurance Plan: Payor: [employer] / Plan: [employer] ASSURE / Product Type: *No Product type* /     Plan for next visit: Begin aquatic to use buoyancy of water to reduce pressure on back.  Focus on improving strength of core and LE while improving tolerance to functional activities.  Pt education as needed.  Pt may progress to land based PT if appropriate where treatment may include therapeutic exs or activities, US or ES    OP EDUCATION:  Outpatient Education  Individual(s) Educated: Patient  Education Provided: Anatomy, Body Mechanics, Physiology, POC  Risk and Benefits Discussed with Patient/Caregiver/Other: yes  Patient/Caregiver Demonstrated Understanding: yes  Plan of Care Discussed and Agreed Upon: yes  Patient Response to Education: Patient/Caregiver Verbalized Understanding of Information, Patient/Caregiver Asked Appropriate Questions    Today's Treatment:  Pt educated on eval findings, POC and benefits of aquatics    Goals:  STGS to be met in 6 visits:  Pt will be able to perform 40 min aquatic session without increased pain.    Pt will move sit to stand with 0-1 UE assist    LTGs to be met in 10 visits  Pt will be able to walk > 5 min with less pain  Pt will demonstrate increased strength in LE by > 1/2 MMT grade so pt can ascend stairs with less pain  Pt will be independent with HEP to allow pt to perform functional activities with less pain.    Pt's goal: Less pain

## 2025-02-28 ENCOUNTER — HOSPITAL ENCOUNTER (OUTPATIENT)
Dept: RADIOLOGY | Facility: HOSPITAL | Age: 50
Discharge: HOME | End: 2025-02-28
Payer: MEDICARE

## 2025-02-28 DIAGNOSIS — M54.16 RADICULOPATHY, LUMBAR REGION: ICD-10-CM

## 2025-02-28 PROCEDURE — 72148 MRI LUMBAR SPINE W/O DYE: CPT

## 2025-03-06 ENCOUNTER — TREATMENT (OUTPATIENT)
Dept: PHYSICAL THERAPY | Facility: CLINIC | Age: 50
End: 2025-03-06
Payer: MEDICARE

## 2025-03-06 DIAGNOSIS — M54.16 RADICULOPATHY, LUMBAR REGION: ICD-10-CM

## 2025-03-06 PROCEDURE — 97113 AQUATIC THERAPY/EXERCISES: CPT | Mod: GP,CQ

## 2025-03-06 NOTE — PROGRESS NOTES
Physical Therapy Treatment    Patient Name: Alysha Burch  MRN: [id number]  Encounter date:  3/6/2025  Time Calculation  Start Time: 0730  Stop Time: 0815  Time Calculation (min): 45 min     PT Therapeutic Procedures Time Entry  Aquatic Therapy Time Entry: 40       Visit Number:  2 (including evaluation)  Planned total visits: 10  Visits Authorized/Insurance Coverage:  2/24/25:NO AUTH, 40.00 CO PAY, 100% [employer], 4500 OOP, [employer] [facility]    Current Problem  Problem List Items Addressed This Visit             ICD-10-CM    Radiculopathy, lumbar region M54.16     Precautions:   None     Relevant PMH:  OA, HTN, Migraines, HA, h/o anemia and ulcers    Pain   7/10 LB down the right leg    Subjective  General  First follow up since evaluation    Objective  2/17 MRI lumbar  FINDINGS:  No significant scoliosis or spondylolisthesis. Persistent superior  endplate Schmorl's node at L5 similar to prior. Disc desiccated  throughout the lumbar spine. No severe loss of disc height at any  level. Predominant Modic type 1 endplate degenerative changes at  L3-4, particularly right-sided and increased from prior study.  Predominant Modic type 2 endplate degenerative changes L5-S1. Minimal  Modic type 1 endplate degenerative changes L2-3.      Conus termination is normal.      Patient has been dorsally decompressed at L5-S1. Fluid along the  spinous process articulations at L3-4 could represent degenerative  inflammation.      LEVELS:  L5-S1: Prior dorsal decompression. Disc bulge with undulating dorsal  contour lateralizing to both sides. Mild-to-moderate facet arthrosis.  Widely patent thecal sac. No significant lateral recess stenosis.  Moderate right and moderately severe left foraminal stenosis. No  significant interval change. L4-L5: Disc bulge lateralized to both  sides. Moderate bilateral facet arthrosis. Ligamentous thickening.  Mild overall trefoil central canal stenosis. No significant lateral  recess stenosis.  Moderate bilateral foraminal stenosis. Right  foraminal stenosis slightly worsened from prior study. L3-L4: Disc  bulge is increased from prior and eccentric to the right with a  superimposed right foraminal/far lateral broad-based disc protrusion.  Disc bulge otherwise lateralized to both sides. Moderate facet  arthrosis with fluid in the facet joints. Ligamentous thickening.  Moderate AP narrowing of the thecal sac at the midline. Moderate  bilateral lateral recess stenosis. Moderately severe right and  mild-to-moderate left foraminal stenosis. Right-sided foraminal  stenosis slightly worsened from prior study because of the increasing  disc protrusion. L2-L3: Disc bulge is increased from prior. Moderate  facet arthrosis. Mild ligamentous thickening. Mild-to-moderate AP  narrowing of the thecal sac. Mild bilateral foraminal stenosis.  L1-L2: Minimal disc bulge. Mild-to-moderate facet arthrosis. No  significant stenosis. No significant interval change.    Treatment:  Aquatic Therapy:     Independent, no device used    The patient is comfortable with water and has done aquatics prior.     bilateral handrail assist and step-over-step     Water walking for ROM and pain reduction: FORWARD, SIDE STEP, and BACKWARDS, 3 LAPS with TrA as able      Standing core and LE strengthening exercises: x 10   Heel raises  toe raises    SLR,   Abduction,    Hamstring curl,    MIP  Extension toe touch   Hip ER      Deep end for core, decompression, pain reduction : small ROM and slow pace instructed   Hang x 2'  Bicycle x 1'  Hang x 2'  Hip ABD/ADD x 1'  Hang x 2'  [place] x 1'  Hang x 2'    Slow return to WB       Current HEP:  To be developed     Has patient been compliant with HEP? No      OP EDUCATION:   TrA rationale     Assessment:   Initiated aquatics for reduction of radicular symptoms, core strength and lumbopelvic ROM.   Pt's response to treatment:  Good   Areas of improvements:  Pain reduction   Limitations/deficits:   "Radicular pain     Pain end of session: \"Better\"       Plan:     Continue with current POC/no changes  Treatment/Interventions: Aquatic therapy, Education/ Instruction, Electrical stimulation, Manual therapy, Neuromuscular re-education, Therapeutic activities, Therapeutic exercises, Ultrasound  PT Plan: Skilled PT  PT Frequency:  (2x/week for 10 visits)  Certification Period Start Date: 02/24/25  Certification Period End Date: 05/25/25  Number of Treatments Authorized: NO AUTH, 40.00 CO PAY, 100% COVERAGE, [place], 4500 OOP, [employer] [facility]  Rehab Potential: Good  Plan of Care Agreement: Patient    Assessment of current progress against goals:  Insufficient treatment time to assess progress    Goals:  STGS to be met in 6 visits:  Pt will be able to perform 40 min aquatic session without increased pain.    Pt will move sit to stand with 0-1 UE assist     LTGs to be met in 10 visits  Pt will be able to walk > 5 min with less pain  Pt will demonstrate increased strength in LE by > 1/2 MMT grade so pt can ascend stairs with less pain  Pt will be independent with HEP to allow pt to perform functional activities with less pain.     Pt's goal: Less pain     "

## 2025-03-10 ENCOUNTER — TREATMENT (OUTPATIENT)
Dept: PHYSICAL THERAPY | Facility: CLINIC | Age: 50
End: 2025-03-10
Payer: MEDICARE

## 2025-03-10 DIAGNOSIS — M54.16 RADICULOPATHY, LUMBAR REGION: ICD-10-CM

## 2025-03-10 PROCEDURE — 97113 AQUATIC THERAPY/EXERCISES: CPT | Mod: GP | Performed by: PHYSICAL THERAPIST

## 2025-03-10 NOTE — PROGRESS NOTES
Physical Therapy Treatment    Patient Name: Alysha Burch  MRN: [id number]  Encounter date:  3/10/2025  Time Calculation  Start Time: 0730  Stop Time: 0815  Time Calculation (min): 45 min     PT Therapeutic Procedures Time Entry  Aquatic Therapy Time Entry: 40       Visit Number:  3 (including evaluation)  Planned total visits: 10  Visits Authorized/Insurance Coverage:  2/24/25:NO AUTH, 40.00 CO PAY, 100% [employer], 4500 OOP, [employer] MCR    Current Problem  Problem List Items Addressed This Visit             ICD-10-CM    Radiculopathy, lumbar region M54.16     Precautions:   None     Relevant PMH:  OA, HTN, Migraines, HA, h/o anemia and ulcers    Pain   7/10 LB down the right leg    Subjective  General  Pt reports general muscle soreness after last session - she was not really expecting that. LBP feel good in the water    Objective  2/17 MRI lumbar  FINDINGS:  No significant scoliosis or spondylolisthesis. Persistent superior  endplate Schmorl's node at L5 similar to prior. Disc desiccated  throughout the lumbar spine. No severe loss of disc height at any  level. Predominant Modic type 1 endplate degenerative changes at  L3-4, particularly right-sided and increased from prior study.  Predominant Modic type 2 endplate degenerative changes L5-S1. Minimal  Modic type 1 endplate degenerative changes L2-3.      Conus termination is normal.      Patient has been dorsally decompressed at L5-S1. Fluid along the  spinous process articulations at L3-4 could represent degenerative  inflammation.      LEVELS:  L5-S1: Prior dorsal decompression. Disc bulge with undulating dorsal  contour lateralizing to both sides. Mild-to-moderate facet arthrosis.  Widely patent thecal sac. No significant lateral recess stenosis.  Moderate right and moderately severe left foraminal stenosis. No  significant interval change. L4-L5: Disc bulge lateralized to both  sides. Moderate bilateral facet arthrosis. Ligamentous thickening.  Mild  overall trefoil central canal stenosis. No significant lateral  recess stenosis. Moderate bilateral foraminal stenosis. Right  foraminal stenosis slightly worsened from prior study. L3-L4: Disc  bulge is increased from prior and eccentric to the right with a  superimposed right foraminal/far lateral broad-based disc protrusion.  Disc bulge otherwise lateralized to both sides. Moderate facet  arthrosis with fluid in the facet joints. Ligamentous thickening.  Moderate AP narrowing of the thecal sac at the midline. Moderate  bilateral lateral recess stenosis. Moderately severe right and  mild-to-moderate left foraminal stenosis. Right-sided foraminal  stenosis slightly worsened from prior study because of the increasing  disc protrusion. L2-L3: Disc bulge is increased from prior. Moderate  facet arthrosis. Mild ligamentous thickening. Mild-to-moderate AP  narrowing of the thecal sac. Mild bilateral foraminal stenosis.  L1-L2: Minimal disc bulge. Mild-to-moderate facet arthrosis. No  significant stenosis. No significant interval change.    Treatment:  Aquatic Therapy:     Independent, no device used    The patient is comfortable with water and has done aquatics prior.     bilateral handrail assist and step-over-step     Water walking for ROM and pain reduction: FORWARD, SIDE STEP, and BACKWARDS, 3 LAPS with TrA as able      Standing core and LE strengthening exercises: x 10 x2  Heel raises  toe raises    SLR with sciatic nerve glide  Abduction,    Hamstring curl,    MIP  Extension toe touch   Hip ER      Deep end for core, decompression, pain reduction : small ROM and slow pace instructed   Hang x 2'  Bicycle x 1'  Hang x 2'  Hip ABD/ADD x 1'  Hang x 2'  [place] x 1'  Hang x 2'    Slow return to WB       Current HEP:  To be developed     Has patient been compliant with HEP? No      OP EDUCATION:   TrA rationale     Assessment:   Pt has pain with both hip extension and hip flexion - sciatic nerve glide.  Able to  "relax core in decompression/hang in deep water.   Pt's response to treatment:  Good   Areas of improvements:  Pain reduction   Limitations/deficits:  Radicular pain     Pain end of session: \"Better\"       Plan:     Continue with current POC/no changes  Treatment/Interventions: Aquatic therapy, Education/ Instruction, Electrical stimulation, Manual therapy, Neuromuscular re-education, Therapeutic activities, Therapeutic exercises, Ultrasound  PT Plan: Skilled PT  PT Frequency:  (2x/week for 10 visits)  Certification Period Start Date: 02/24/25  Certification Period End Date: 05/25/25  Number of Treatments Authorized: NO AUTH, 40.00 CO PAY, 100% COVERAGE, [place], 4500 OOP, [employer] [facility]  Rehab Potential: Good  Plan of Care Agreement: Patient    Assessment of current progress against goals:  Insufficient treatment time to assess progress    Goals:  STGS to be met in 6 visits:  Pt will be able to perform 40 min aquatic session without increased pain.    Pt will move sit to stand with 0-1 UE assist     LTGs to be met in 10 visits  Pt will be able to walk > 5 min with less pain  Pt will demonstrate increased strength in LE by > 1/2 MMT grade so pt can ascend stairs with less pain  Pt will be independent with HEP to allow pt to perform functional activities with less pain.     Pt's goal: Less pain     "

## 2025-03-13 ENCOUNTER — TREATMENT (OUTPATIENT)
Dept: PHYSICAL THERAPY | Facility: CLINIC | Age: 50
End: 2025-03-13
Payer: MEDICARE

## 2025-03-13 DIAGNOSIS — M54.16 RADICULOPATHY, LUMBAR REGION: ICD-10-CM

## 2025-03-13 PROCEDURE — 97113 AQUATIC THERAPY/EXERCISES: CPT | Mod: GP,CQ

## 2025-03-13 NOTE — PROGRESS NOTES
"    Physical Therapy Treatment    Patient Name: Alysha Burch  MRN: [id number]  Encounter date:  3/13/2025  Time Calculation  Start Time: 0730  Stop Time: 0815  Time Calculation (min): 45 min     PT Therapeutic Procedures Time Entry  Aquatic Therapy Time Entry: 40       Visit Number:  4 (including evaluation)  Planned total visits: 10  Visits Authorized/Insurance Coverage:  2/24/25:NO AUTH, 40.00 CO PAY, [employer]% [employer], [place], 4500 OOP, [employer] M[employer]    Current Problem  Problem List Items Addressed This Visit             ICD-10-CM    Radiculopathy, lumbar region M54.16     Precautions:  disc bulge multiple levels      Relevant PMH:  OA, HTN, Migraines, HA, h/o anemia and ulcers, disc bulge multiple levels     Pain   7/10 LB and right leg     Subjective  General  \"It was better after the second session. I joined silver sneakers but I will not go until my pain is down.\"   \"I can sleep on my side better, it is not as tight.\"     Objective   Small ROM with ER due to discomfort     Treatment:  Aquatic Therapy:     Independent, no device used    The patient is comfortable with water and has done aquatics prior.     bilateral handrail assist and step-over-step     Water walking for ROM and pain reduction: FORWARD, SIDE STEP, and BACKWARDS, 3 LAPS with TrA as able    Transverse abdominus 3\" x 10 instructed (TrA)    Standing core and LE strengthening exercises: 2x10 - TrA as able   -Heel raises  -toe raises    -SLR with sciatic nerve glide  -Abduction,    -Extension toe touch   -MIP  -Hamstring curl,    -Hip ER  - sm ROM - discomfort second set     Deep end for core, decompression, pain reduction : small ROM and slow pace instructed   Hang x 2'  Bicycle x 1'  Hang x 2'  Hip ABD/ADD x 1'  Hang x 2'  [place] x 1'  Hang x 2'    Zig zag for slow return to WB     Current HEP:  To be developed     Has patient been compliant with HEP? No    OP EDUCATION:   TrA rationale     Assessment:   Continued with lumbopelvic strengthening " "and ROM.   Pt's response to treatment: Good   Areas of improvements:   sleeping on side   Limitations/deficits:  Weakness, ROM limited and affects function, and radicular pain RLE.    Pain end of session:  \"Lower. 6/10\"      Assessment of current progress against goals:  Progressing toward functional goals       Plan:     Continue with current POC/no changes  Treatment/Interventions: Aquatic therapy, Education/ Instruction, Electrical stimulation, Manual therapy, Neuromuscular re-education, Therapeutic activities, Therapeutic exercises, Ultrasound  PT Plan: Skilled PT  PT Frequency:  (2x/week for 10 visits)  Certification Period Start Date: 02/24/25  Certification Period End Date: 05/25/25  Number of Treatments Authorized: NO AUTH, 40.00 CO PAY, [place]% [employer], 4500 OOP, [employer] [facility]  Rehab Potential: Good  Plan of Care Agreement: Patient      Goals:  STGS to be met in 6 visits:  Pt will be able to perform 40 min aquatic session without increased pain.    Pt will move sit to stand with 0-1 UE assist     LTGs to be met in 10 visits  Pt will be able to walk > 5 min with less pain  Pt will demonstrate increased strength in LE by > 1/2 MMT grade so pt can ascend stairs with less pain  Pt will be independent with HEP to allow pt to perform functional activities with less pain.     Pt's goal: Less pain     "

## 2025-03-17 ENCOUNTER — TREATMENT (OUTPATIENT)
Dept: PHYSICAL THERAPY | Facility: CLINIC | Age: 50
End: 2025-03-17
Payer: MEDICARE

## 2025-03-17 DIAGNOSIS — M54.16 RADICULOPATHY, LUMBAR REGION: ICD-10-CM

## 2025-03-17 PROCEDURE — 97113 AQUATIC THERAPY/EXERCISES: CPT | Mod: GP,CQ

## 2025-03-17 NOTE — PROGRESS NOTES
"    Physical Therapy Treatment    Patient Name: Alysha Burch  MRN: [id number]  Encounter date:  3/17/2025                Visit Number:  5 (including evaluation)  Planned total visits: 10  Visits Authorized/Insurance Coverage:  2/24/25:NO AUTH, 40.00 CO PAY, [place]% [employer], 4500 OOP, [employer] M[employer]    Current Problem  Problem List Items Addressed This Visit    None      Precautions:  disc bulge multiple levels      Relevant PMH:  OA, HTN, Migraines, HA, h/o anemia and ulcers, disc bulge multiple levels     Pain   8+/10 LB and right leg     Subjective  General  Patient reports that it was a bad day today, she had to do a lot of walking at work that she knows she is not suppose to and looked forward to her session today.       Objective   Small ROM with Add/add due to discomfort    Increased pain with side walking the decreased in the deep end afterwards    Treatment:  Aquatic Therapy:     Independent, no device used    The patient is comfortable with water and has done aquatics prior.     bilateral handrail assist and step-over-step     Water walking for ROM and pain reduction: FORWARD, SIDE STEP, and BACKWARDS, 3 LAPS with TrA as able    Transverse abdominus 3\" x 10 instructed (TrA)    Deep end for core, decompression, pain reduction : small ROM and slow pace instructed   Hang x 2'  Bicycle x 1'  Hang x 2'  Hip ABD/ADD x 1'  Hang x 2'  [place] x 1'  Hang x 2'  Clamshells x20 R/L  Hang x2'  DKTC in corner of pool 2'  Hang x2'    Back stretch B feet on wall holding onto railing 2 x 30\"    Zig zag for slow return to WB    DNP  Standing core and LE strengthening exercises: 2x10 - TrA as able   -Heel raises  -toe raises    -SLR with sciatic nerve glide  -Abduction,    -Extension toe touch   -MIP  -Hamstring curl,    -Hip ER  - sm ROM - discomfort second set     Current HEP:  To be developed     Has patient been compliant with HEP? No    OP EDUCATION:   TrA rationale     Assessment:    Pt's response to treatment: " Spent most of session in the deep end secondary to non-tolerance to shallow end because of 8+ pain upon arrival.   She was able to tolerate additional exercises while in the deep end without increased sx. She particularly liked the back stretch with feet on pool wall.  Areas of improvements:   sleeping on side   Limitations/deficits:  Weakness, ROM limited and affects function, and radicular pain RLE.    Pain end of session:  6.5/10      Assessment of current progress against goals:  Progressing toward functional goals       Plan:     Continue with current POC/no changes  Treatment/Interventions: Aquatic therapy, Education/ Instruction, Electrical stimulation, Manual therapy, Neuromuscular re-education, Therapeutic activities, Therapeutic exercises, Ultrasound  PT Plan: Skilled PT  PT Frequency:  (2x/week for 10 visits)  Certification Period Start Date: 02/24/25  Certification Period End Date: 05/25/25  Number of Treatments Authorized: NO AUTH, 40.00 CO PAY, [place]% [employer], [place] OOP, [employer] [facility]  Rehab Potential: Good  Plan of Care Agreement: Patient      Goals:  STGS to be met in 6 visits:  Pt will be able to perform 40 min aquatic session without increased pain.    Pt will move sit to stand with 0-1 UE assist     LTGs to be met in 10 visits  Pt will be able to walk > 5 min with less pain  Pt will demonstrate increased strength in LE by > 1/2 MMT grade so pt can ascend stairs with less pain  Pt will be independent with HEP to allow pt to perform functional activities with less pain.     Pt's goal: Less pain

## 2025-03-21 ENCOUNTER — TREATMENT (OUTPATIENT)
Dept: PHYSICAL THERAPY | Facility: CLINIC | Age: 50
End: 2025-03-21
Payer: MEDICARE

## 2025-03-21 DIAGNOSIS — M54.16 RADICULOPATHY, LUMBAR REGION: ICD-10-CM

## 2025-03-21 PROCEDURE — 97113 AQUATIC THERAPY/EXERCISES: CPT | Mod: GP,CQ

## 2025-03-21 NOTE — PROGRESS NOTES
"    Physical Therapy Treatment    Patient Name: Alysha Burch  MRN: [id number]  Encounter date:  3/21/2025  Time Calculation  Start Time: 0730  Stop Time: 0815  Time Calculation (min): 45 min     PT Therapeutic Procedures Time Entry  Aquatic Therapy Time Entry: 40       Visit Number:  6 (including evaluation)  Planned total visits: 10  Visits Authorized/Insurance Coverage:  2/24/25:NO AUTH, 40.00 CO PAY, [place] [employer] O[place], [employer] M[employer]    Current Problem  Problem List Items Addressed This Visit             ICD-10-CM    Radiculopathy, lumbar region M54.16       Precautions:  disc bulge multiple levels      Relevant PMH:  OA, HTN, Migraines, HA, h/o anemia and ulcers, disc bulge multiple levels     Pain  5/10    Subjective  General  \"I am much better now. 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     bilateral handrail assist and step-over-step     Water walking for ROM and pain reduction: FORWARD, SIDE STEP, and BACKWARDS, 3 LAPS with TrA as able    Transverse abdominus 3\" x 10 instructed (TrA)    Standing core and LE strengthening exercises: 2x10 - TrA as able   -Heel raises  -toe raises    -SLR with sciatic nerve glide  -Abduction,    -Extension toe touch   -MIP  -Hamstring curl,    -Hip ER  - sm ROM     Deep end for core, decompression, pain reduction : small ROM and slow pace instructed   Hang x 2'  Bicycle x 2'  Hang x 2'  Hip ABD/ADD x 2'  Hang x 2'    Back stretch B feet on wall holding onto railing  x 30\"    Zig zag for slow return to WB        Current HEP:  To be developed     Has patient been compliant with HEP? No    OP EDUCATION:   TrA rationale     Assessment:   The patients pain was down today and she was able to resume to shallow end exercises.   Pt's response to treatment: Good " "  Areas of improvements:  Decreased pain  Limitations/deficits:  Weakness, Unstable, and Pain limits ADL.  Back pain from loose skin of abdomen with ADL.     Pain end of session:  \"Same\"       Assessment of current progress against goals:  Progressing toward functional goals     Plan:     Continue with current POC/no changes  Treatment/Interventions: Aquatic therapy, Education/ Instruction, Electrical stimulation, Manual therapy, Neuromuscular re-education, Therapeutic activities, Therapeutic exercises, Ultrasound  PT Plan: Skilled PT  PT Frequency:  (2x/week for 10 visits)  Certification Period Start Date: 02/24/25  Certification Period End Date: 05/25/25  Number of Treatments Authorized: NO AUTH, 40.00 CO PAY, [place]% [employer], [place]00 OOP, [employer] [facility]  Rehab Potential: Good  Plan of Care Agreement: Patient      Goals:  STGS to be met in 6 visits:  Pt will be able to perform 40 min aquatic session without increased pain.    Pt will move sit to stand with 0-1 UE assist     LTGs to be met in 10 visits  Pt will be able to walk > 5 min with less pain  Pt will demonstrate increased strength in LE by > 1/2 MMT grade so pt can ascend stairs with less pain  Pt will be independent with HEP to allow pt to perform functional activities with less pain.     Pt's goal: Less pain     "

## 2025-03-24 ENCOUNTER — TREATMENT (OUTPATIENT)
Dept: PHYSICAL THERAPY | Facility: CLINIC | Age: 50
End: 2025-03-24
Payer: MEDICARE

## 2025-03-24 DIAGNOSIS — M54.16 RADICULOPATHY, LUMBAR REGION: ICD-10-CM

## 2025-03-24 PROCEDURE — 97113 AQUATIC THERAPY/EXERCISES: CPT | Mod: GP,CQ

## 2025-03-24 NOTE — PROGRESS NOTES
"    Physical Therapy Treatment    Patient Name: Alysha Burch  MRN: [id number]  Encounter date:  3/24/2025  Time Calculation  Start Time: 0730  Stop Time: 0815  Time Calculation (min): 45 min     PT Therapeutic Procedures Time Entry  Aquatic Therapy Time Entry: 40       Visit Number:  7 (including evaluation)  Planned total visits: 10  Visits Authorized/Insurance Coverage:  2/24/25:NO AUTH, 40.00 CO PAY, [place]% [employer]00 O[place], [employer] M[employer]    Current Problem  Problem List Items Addressed This Visit             ICD-10-CM    Radiculopathy, lumbar region M54.16     Precautions:  disc bulge multiple levels      Relevant PMH:  OA, HTN, Migraines, HA, h/o anemia and ulcers, disc bulge multiple levels     Pain  6/10 LB and down the right leg and a little into the left knee     Subjective  General  \"I see pain management today.\"     Objective  Good SL balance shallow end     Treatment:  Aquatic Therapy:     Independent, no device used    The patient is comfortable with water and has done aquatics prior.     bilateral handrail assist and step-over-step     Water walking for ROM and pain reduction: FORWARD, SIDE STEP, and BACKWARDS, 3 LAPS with TrA as able    Transverse abdominus 3\" x 10 instructed (TrA)    Standing core and LE strengthening exercises: - TrA as able   - Heel toe rocks x 20  - 3 way dynamic hip x 10  - March across pool x 2 laps   - Hip ER  - sm ROM - 2x10     -LTR with noodle x 10   -rows with noodle 2x10     Deep end for core, decompression, pain reduction : small ROM and slow pace instructed   -Hang x 2'  -Bicycle x 2'  -Hang x 2'  -Hip ABD/ADD x 2'  -Hang x 2'  -ccx x 2'  -Hang x 2'    Back stretch B feet on wall holding onto railing  2 x 30\"    Zig zag for slow return to WB    Current HEP:  To be developed     Has patient been compliant with HEP? No    OP EDUCATION:   TrA rationale     Assessment:   The patient was able to tolerate in increase in activity intensity this date in the shallow end. " "Pain down at exit.   Pt's response to treatment: Good   Areas of improvements:  Decreased pain  Limitations/deficits:  Weakness and ROM limited and affects function    Pain end of session:  \"I feel pretty good.\" 5/10     Assessment of current progress against goals:  Progressing toward functional goals       Plan:     Continue with current POC/no changes  Treatment/Interventions: Aquatic therapy, Education/ Instruction, Electrical stimulation, Manual therapy, Neuromuscular re-education, Therapeutic activities, Therapeutic exercises, Ultrasound  PT Plan: Skilled PT  PT Frequency:  (2x/week for 10 visits)  Certification Period Start Date: 02/24/25  Certification Period End Date: 05/25/25  Number of Treatments Authorized: NO AUTH, 40.00 CO PAY, [place]% [employer], [place]00 OOP, [employer] [facility]  Rehab Potential: Good  Plan of Care Agreement: Patient      Goals:  STGS to be met in 6 visits:  Pt will be able to perform 40 min aquatic session without increased pain.    Pt will move sit to stand with 0-1 UE assist     LTGs to be met in 10 visits  Pt will be able to walk > 5 min with less pain  Pt will demonstrate increased strength in LE by > 1/2 MMT grade so pt can ascend stairs with less pain  Pt will be independent with HEP to allow pt to perform functional activities with less pain.     Pt's goal: Less pain     "

## 2025-03-26 ENCOUNTER — TREATMENT (OUTPATIENT)
Dept: PHYSICAL THERAPY | Facility: CLINIC | Age: 50
End: 2025-03-26
Payer: MEDICARE

## 2025-03-26 DIAGNOSIS — M54.16 RADICULOPATHY, LUMBAR REGION: ICD-10-CM

## 2025-03-26 PROCEDURE — 97113 AQUATIC THERAPY/EXERCISES: CPT | Mod: GP

## 2025-03-26 NOTE — PROGRESS NOTES
"    Physical Therapy Treatment    Patient Name: Alysha Burch  MRN: [id number]  Encounter date:  3/26/2025  Time Calculation  Start Time: 0750  Stop Time: 0837  Time Calculation (min): 47 min     PT Therapeutic Procedures Time Entry  Aquatic Therapy Time Entry: 45       Visit Number:  8 (including evaluation)  Planned total visits: 10  Visits Authorized/Insurance Coverage:  2/24/25: NO AUTH, 40.00 CO PAY, [place]% [employer]00 O[place], [employer] M[employer]    Current Problem  Problem List Items Addressed This Visit             ICD-10-CM    Radiculopathy, lumbar region M54.16       Precautions:  disc bulge multiple levels      Relevant PMH:  OA, HTN, Migraines, HA, h/o anemia and ulcers, disc bulge multiple levels     Pain  6/10    Subjective  General  Pt reports she is off work this week so she has been able to relax a little more.  She did clean a lot  yesterday which aggravated her pain.  She is frustrated with pain with performing daily tasks.  She saw pain management doctor who told her she may need to consult surgeon.  Pt states she is able to use [employer] with [employer]ne[employer] through her insurance.    Objective  Pt  walks with trunk slightly forward flexed in water.  No antalgia noted.    Treatment:  Aquatic Therapy:     Pt enters and exits pool via steps with B rails  Water walking for ROM and pain reduction: FORWARD, SIDE STEP, and BACKWARDS, 3 LAPS with TrA as able    Standing core and LE strengthening exercises: - TrA as able   Heel toe rocks x 20  3 way dynamic hip 2x 10  Hip ER  - sm ROM - 2x10     March across pool 3 laps     Deep end for core, decompression, pain reduction : small ROM and slow pace instructed   Hang x 2'  Bicycle x 2'  Hang x 2'  Hip ABD/ADD x 2'  Hang x 2'  ccx x 2'  Hang x 2'  Back stretch B feet on wall holding onto railing  2 x 30\"  Hang x 2 min    Zig zag walk across pool for slow return to WB    Hamstring stretch on 2nd step 20\"x2 R/L      Current HEP:  Aquatic ex routine handout provided to " pt    Has patient been compliant with HEP? No    OP EDUCATION:   POC, goals, rationale for aquatic exs    Assessment:   Pt's response to treatment: Pt able to perform all aquatic exs with good form and without increased pain.   Areas of improvements:  Decreased pain  Limitations/deficits:  Weakness and ROM limited and affects function    Pain end of session:  5/10     Assessment of current progress against goals:  Progressing toward functional goals       Plan:     Continue with current POC with the following changes begin land therapy next session.      Goals:  STGS to be met in 6 visits:  Pt will be able to perform 40 min aquatic session without increased pain.  - MET  Pt will move sit to stand with 0-1 UE assist- PROGRESSING     LTGs to be met in 10 visits  Pt will be able to walk > 5 min with less pain  Pt will demonstrate increased strength in LE by > 1/2 MMT grade so pt can ascend stairs with less pain  Pt will be independent with HEP to allow pt to perform functional activities with less pain.     Pt's goal: Less pain

## 2025-03-31 ENCOUNTER — TREATMENT (OUTPATIENT)
Dept: PHYSICAL THERAPY | Facility: CLINIC | Age: 50
End: 2025-03-31
Payer: MEDICARE

## 2025-03-31 DIAGNOSIS — M54.16 RADICULOPATHY, LUMBAR REGION: ICD-10-CM

## 2025-03-31 PROCEDURE — 97035 APP MDLTY 1+ULTRASOUND EA 15: CPT | Mod: GP

## 2025-03-31 PROCEDURE — 97110 THERAPEUTIC EXERCISES: CPT | Mod: GP

## 2025-03-31 NOTE — PROGRESS NOTES
"    Physical Therapy Treatment    Patient Name: Alysha Burch  MRN: [id number]  Encounter date:  3/31/2025  Time Calculation  Start Time: 0731  Stop Time: 0816  Time Calculation (min): 45 min  PT Modalities Time Entry  Ultrasound Time Entry: 9  PT Therapeutic Procedures Time Entry  Therapeutic Exercise Time Entry: 32       Visit Number:  9 (including evaluation)  Planned total visits: 10  Visits Authorized/Insurance Coverage:  2/24/25: NO AUTH, 40.00 CO PAY, [place]% [employer], [place]00 OOP, [employer] M[employer]    Current Problem  Problem List Items Addressed This Visit             ICD-10-CM    Radiculopathy, lumbar region M54.16         Precautions:  disc bulge multiple levels      Relevant PMH:  OA, HTN, Migraines, HA, h/o anemia and ulcers, disc bulge multiple levels     Pain  7/10    Subjective  General  Pt reports her R shin and LE are bad today, unsure why.  Pt states she woke up more sore today.    Objective  Guarded movments today     Treatment:  THERAPEUTIC EXS:  HL more comfortable than supine  SKTC 15\"x3 R/L  Supine hamstring stretch 20\"x3 R/L  Hooklying trans ab brace 10x2    Seated trans ab 10x then 3\"x10- discussed rationale for this ex as well as incorporation of this into ADLs.  Pt tried sit to stand with and without ab brace.    Seated EOC hamstring stretch 20\"x1 R/L  Long sit hamstring stretch 20\"x2    ULTRASOUND applied to R> L LS area and prox glut   Parameters:  1 MHz  Percentage:  50%  Patient Position:  L sidelying  Intensity:  1.3 [place]/cm2  Time:  9 min        Current HEP:  Hamstring stretch long sit and supine  SKTC  Seated or HL trans ab     Aquatic ex routine handout provided to pt    Has patient been compliant with HEP? No    OP EDUCATION:   POC, goals, rationale for  exs    Assessment:   Pt's response to treatment: Pt able to demosntrate ab brace correctly.  Areas of improvements:  less muscle guarding with movement off treatment table  Limitations/deficits:  pain    Pain end of session:  5/10 "     Assessment of current progress against goals:  Pt improving but progress is slow       Plan:     Continue with current POC with the following changes assess tolerance.      Goals:  STGS to be met in 6 visits:  Pt will be able to perform 40 min aquatic session without increased pain.  - MET  Pt will move sit to stand with 0-1 UE assist- PROGRESSING     LTGs to be met in 10 visits  Pt will be able to walk > 5 min with less pain  Pt will demonstrate increased strength in LE by > 1/2 MMT grade so pt can ascend stairs with less pain  Pt will be independent with HEP to allow pt to perform functional activities with less pain.     Pt's goal: Less pain

## 2025-04-02 ENCOUNTER — TREATMENT (OUTPATIENT)
Dept: PHYSICAL THERAPY | Facility: CLINIC | Age: 50
End: 2025-04-02
Payer: MEDICARE

## 2025-04-02 DIAGNOSIS — M54.16 RADICULOPATHY, LUMBAR REGION: ICD-10-CM

## 2025-04-02 PROCEDURE — 97110 THERAPEUTIC EXERCISES: CPT | Mod: GP

## 2025-04-02 PROCEDURE — 97035 APP MDLTY 1+ULTRASOUND EA 15: CPT | Mod: GP

## 2025-04-02 NOTE — PROGRESS NOTES
"    Physical Therapy Treatment/ Progress Note    Patient Name: Alysha Burch  MRN: [id number]  Encounter date:  4/2/2025  Time Calculation  Start Time: 0745  Stop Time: 0815  Time Calculation (min): 30 min  PT Modalities Time Entry  Ultrasound Time Entry: 9  PT Therapeutic Procedures Time Entry  Therapeutic Exercise Time Entry: 20       Visit Number:  10 (including evaluation)  Planned total visits: 10  Visits Authorized/Insurance Coverage:  2/24/25: NO AUTH, 40.00 CO PAY, 100% [employer], 4500 OOP, [employer] M[employer]    Current Problem  Problem List Items Addressed This Visit             ICD-10-CM    Radiculopathy, lumbar region M54.16           Precautions:  disc bulge multiple levels      Relevant PMH:  OA, HTN, Migraines, HA, h/o anemia and ulcers, disc bulge multiple levels     Pain  8/10    Subjective  General  Pt reports she is having more pain in R LB and down  R LE lateral R hip to ant thigh to shin.  She has really been working on ab brace and abs are more sore.  Pt has been lifting more at work so she feels that is why she is more aggravated. She is doing ab brace with lifting which helps some.  She is doing a lot of sit to stand from chair at work.      Objective  Standing flex test negative.  Pt very tender above R SI at L5 psp    Treatment:  THERAPEUTIC EXS:  SKTC 15\"x1 R/L  HL hamstring stretch 20\"x2 R/L  HL piriformis stretch 20\"x2 R- also tried this ex in supine but pt felt stretch better in hooklying.    Hooklying trans ab brace 10x2 3 sec hold  Discussed ab brace incorporation with lifting, bending and rolling as well as sit to stand    ULTRASOUND applied to R> L LS area and prox glut   Parameters:  1 MHz  Percentage:  50%  Patient Position:  L sidelying  Intensity:  1.3 [place]/cm2  Time:  9 min        Current HEP:  Hamstring stretch long sit and supine  SKTC  Seated or HL trans ab   HL piriformis stretch    Aquatic ex routine handout provided to pt    Has patient been compliant with HEP? Yes    OP " EDUCATION:   POC, goals, rationale for  exs    Assessment:   Pt's response to treatment: Pt notes less tenderness post US.  Pt felt good stretch in R LB with exs.  Areas of improvements:  less muscle guarding with movement off treatment table  Limitations/deficits:  pain    Pain end of session:  6/10     Assessment of current progress against goals:  Pt improving but progress is slow       Plan:     Continue with current POC with the following changes continue land based PT for up to 10 more sessions to address goals set at eval.       Goals:  STGS to be met in 6 visits:  Pt will be able to perform 40 min aquatic session without increased pain.  - MET  Pt will move sit to stand with 0-1 UE assist- PROGRESSING     LTGs to be met in 10 visits  Pt will be able to walk > 5 min with less pain  Pt will demonstrate increased strength in LE by > 1/2 MMT grade so pt can ascend stairs with less pain  Pt will be independent with HEP to allow pt to perform functional activities with less pain.     Pt's goal: Less pain

## 2025-04-07 ENCOUNTER — APPOINTMENT (OUTPATIENT)
Dept: PHYSICAL THERAPY | Facility: CLINIC | Age: 50
End: 2025-04-07
Payer: MEDICARE

## 2025-04-16 ENCOUNTER — APPOINTMENT (OUTPATIENT)
Dept: PHYSICAL THERAPY | Facility: CLINIC | Age: 50
End: 2025-04-16
Payer: MEDICARE

## 2025-04-18 ENCOUNTER — APPOINTMENT (OUTPATIENT)
Dept: PHYSICAL THERAPY | Facility: CLINIC | Age: 50
End: 2025-04-18
Payer: MEDICARE

## 2025-04-23 ENCOUNTER — APPOINTMENT (OUTPATIENT)
Dept: PHYSICAL THERAPY | Facility: CLINIC | Age: 50
End: 2025-04-23
Payer: MEDICARE

## 2025-04-25 ENCOUNTER — APPOINTMENT (OUTPATIENT)
Dept: PHYSICAL THERAPY | Facility: CLINIC | Age: 50
End: 2025-04-25
Payer: MEDICARE

## 2025-04-28 ENCOUNTER — APPOINTMENT (OUTPATIENT)
Dept: PHYSICAL THERAPY | Facility: CLINIC | Age: 50
End: 2025-04-28
Payer: MEDICARE

## 2025-04-30 ENCOUNTER — HOSPITAL ENCOUNTER (EMERGENCY)
Facility: HOSPITAL | Age: 50
Discharge: HOME | End: 2025-04-30
Payer: MEDICARE

## 2025-04-30 ENCOUNTER — APPOINTMENT (OUTPATIENT)
Dept: RADIOLOGY | Facility: HOSPITAL | Age: 50
End: 2025-04-30
Payer: MEDICARE

## 2025-04-30 VITALS
OXYGEN SATURATION: 99 % | HEIGHT: 67 IN | SYSTOLIC BLOOD PRESSURE: 105 MMHG | RESPIRATION RATE: 16 BRPM | BODY MASS INDEX: 25.9 KG/M2 | DIASTOLIC BLOOD PRESSURE: 83 MMHG | WEIGHT: 165 LBS | TEMPERATURE: 97.7 F | HEART RATE: 69 BPM

## 2025-04-30 DIAGNOSIS — G43.109 OCULAR MIGRAINE: Primary | ICD-10-CM

## 2025-04-30 PROCEDURE — 70450 CT HEAD/BRAIN W/O DYE: CPT

## 2025-04-30 PROCEDURE — 2500000001 HC RX 250 WO HCPCS SELF ADMINISTERED DRUGS (ALT 637 FOR MEDICARE OP)

## 2025-04-30 PROCEDURE — 99284 EMERGENCY DEPT VISIT MOD MDM: CPT | Mod: 25

## 2025-04-30 PROCEDURE — 2500000004 HC RX 250 GENERAL PHARMACY W/ HCPCS (ALT 636 FOR OP/ED): Mod: JZ

## 2025-04-30 PROCEDURE — 96374 THER/PROPH/DIAG INJ IV PUSH: CPT

## 2025-04-30 PROCEDURE — 70450 CT HEAD/BRAIN W/O DYE: CPT | Performed by: RADIOLOGY

## 2025-04-30 PROCEDURE — 96375 TX/PRO/DX INJ NEW DRUG ADDON: CPT

## 2025-04-30 RX ORDER — PROCHLORPERAZINE EDISYLATE 5 MG/ML
10 INJECTION INTRAMUSCULAR; INTRAVENOUS ONCE
Status: COMPLETED | OUTPATIENT
Start: 2025-04-30 | End: 2025-04-30

## 2025-04-30 RX ORDER — DIPHENHYDRAMINE HYDROCHLORIDE 50 MG/ML
50 INJECTION, SOLUTION INTRAMUSCULAR; INTRAVENOUS ONCE
Status: COMPLETED | OUTPATIENT
Start: 2025-04-30 | End: 2025-04-30

## 2025-04-30 RX ORDER — KETOROLAC TROMETHAMINE 30 MG/ML
15 INJECTION, SOLUTION INTRAMUSCULAR; INTRAVENOUS ONCE
Status: DISCONTINUED | OUTPATIENT
Start: 2025-04-30 | End: 2025-04-30

## 2025-04-30 RX ORDER — ACETAMINOPHEN 325 MG/1
975 TABLET ORAL ONCE
Status: COMPLETED | OUTPATIENT
Start: 2025-04-30 | End: 2025-04-30

## 2025-04-30 RX ORDER — MORPHINE SULFATE 4 MG/ML
4 INJECTION, SOLUTION INTRAMUSCULAR; INTRAVENOUS ONCE
Status: COMPLETED | OUTPATIENT
Start: 2025-04-30 | End: 2025-04-30

## 2025-04-30 RX ADMIN — ACETAMINOPHEN 975 MG: 325 TABLET ORAL at 11:09

## 2025-04-30 RX ADMIN — DIPHENHYDRAMINE HYDROCHLORIDE 50 MG: 50 INJECTION, SOLUTION INTRAMUSCULAR; INTRAVENOUS at 11:09

## 2025-04-30 RX ADMIN — MORPHINE SULFATE 4 MG: 4 INJECTION, SOLUTION INTRAMUSCULAR; INTRAVENOUS at 12:57

## 2025-04-30 RX ADMIN — PROCHLORPERAZINE EDISYLATE 10 MG: 5 INJECTION INTRAMUSCULAR; INTRAVENOUS at 11:09

## 2025-04-30 RX ADMIN — HYDROMORPHONE HYDROCHLORIDE 0.5 MG: 1 INJECTION, SOLUTION INTRAMUSCULAR; INTRAVENOUS; SUBCUTANEOUS at 13:56

## 2025-04-30 ASSESSMENT — COLUMBIA-SUICIDE SEVERITY RATING SCALE - C-SSRS
2. HAVE YOU ACTUALLY HAD ANY THOUGHTS OF KILLING YOURSELF?: NO
6. HAVE YOU EVER DONE ANYTHING, STARTED TO DO ANYTHING, OR PREPARED TO DO ANYTHING TO END YOUR LIFE?: NO
1. IN THE PAST MONTH, HAVE YOU WISHED YOU WERE DEAD OR WISHED YOU COULD GO TO SLEEP AND NOT WAKE UP?: NO

## 2025-04-30 ASSESSMENT — PAIN - FUNCTIONAL ASSESSMENT
PAIN_FUNCTIONAL_ASSESSMENT: 0-10

## 2025-04-30 ASSESSMENT — PAIN SCALES - GENERAL
PAINLEVEL_OUTOF10: 0 - NO PAIN
PAINLEVEL_OUTOF10: 10 - WORST POSSIBLE PAIN
PAINLEVEL_OUTOF10: 6
PAINLEVEL_OUTOF10: 5 - MODERATE PAIN

## 2025-04-30 NOTE — ED PROVIDER NOTES
HPI   Chief Complaint   Patient presents with    Headache     Patient stated that she has a hx of ocular headaches. She stated that she vomited twice today at her job and that her pain is 10/10. She stated that she lost her eyesight in the past but today she did not.        Patient is a 50-year-old female presenting concerns for migraine headache.  It started earlier today.  She typically takes Topamax which she takes nightly.  She has had migraines where she has had vision loss.  States that she did lose temper vision in her right eye that is coming back.  She had been at the [facility] ED evaluated for this previously and that is when she was diagnosed with ocular migraines.  States this is when the worst migraines of her life.  Tended to have pain.  She presents via EMS and had nausea vomiting.  Was given Zofran by EMS.  Patient denies fevers, chills, cough, sore throat, runny nose, chest pain, shortness of breath, abdominal pain, diarrhea or urinary complaints.              Patient History   Medical History[1]  Surgical History[2]  Family History[3]  Social History[4]    Physical Exam   ED Triage Vitals   Temp Pulse Resp BP   -- -- -- --      SpO2 Temp src Heart Rate Source Patient Position   -- -- -- --      BP Location FiO2 (%)     -- --       Physical Exam  Vitals and nursing note reviewed.   Constitutional:       Appearance: She is well-developed.      Comments: Awake, laying in examination bed, in obvious discomfort holding her head   HENT:      Head: Normocephalic and atraumatic.      Nose: Nose normal.      Mouth/Throat:      Mouth: Mucous membranes are moist.      Pharynx: Oropharynx is clear.   Eyes:      Extraocular Movements: Extraocular movements intact.      Conjunctiva/sclera: Conjunctivae normal.      Pupils: Pupils are equal, round, and reactive to light.   Cardiovascular:      Rate and Rhythm: Normal rate and regular rhythm.      Pulses: Normal pulses.      Heart sounds: Normal heart sounds. No murmur  heard.  Pulmonary:      Effort: Pulmonary effort is normal. No respiratory distress.      Breath sounds: Normal breath sounds.   Abdominal:      General: Abdomen is flat.      Palpations: Abdomen is soft.      Tenderness: There is no abdominal tenderness.   Musculoskeletal:         General: No swelling. Normal range of motion.      Cervical back: Normal range of motion and neck supple.   Skin:     General: Skin is warm and dry.      Capillary Refill: Capillary refill takes less than 2 seconds.   Neurological:      General: No focal deficit present.      Mental Status: She is alert and oriented to person, place, and time.   Psychiatric:         Mood and Affect: Mood normal.         Behavior: Behavior normal.           ED Course & MDM   ED Course as of 04/30/25 2140 Wed Apr 30, 2025   1232 Minimal improvement to the headache.  Will order morphine. [AR]      ED Course User Index  [AR] Arnel Benjamin PA-C         Diagnoses as of 04/30/25 2140   Ocular migraine                 No data recorded     [place] Coma Scale Score: 15 (04/30/25 1054 : Miriam Metcalf RN)                           Medical Decision Making  Patient is a 50-year-old female presenting with concerns for migraine headache.  Imaging ordered due to patient described as the worst migraine she has had.  Migraine cocktail ordered.  Will withhold Toradol at this time.  Conditions considered include but are not limited to: Migraine headache, intracranial pathology.    Due to patient describing the worst headache of her life, possible suspicion of subarachnoid hemorrhage.  CT ordered.  CT without acute findings.  Patient required multiple medications to improve headache.  Patient headache eventually did subside after multiple medications.    I believe this patient is at low risk for complication, and a disposition of discharge is acceptable.  Return to the Emergency Department if new or worsening symptoms including headache, fever, chills, chest pain,  "shortness of breath, syncope, near syncope, abdominal pain, nausea, vomiting,  diarrhea, or worsening pain.  Patient has open prescription for narcotics.  Will not send additional narcotics at this time.  Referral to neurology given.  Patient is agreeable to a disposition of discharge and to follow with respective fields in the next several days.    Portions of this note made with Dragon software, please be mindful of potential grammatical errors.      Medications   diphenhydrAMINE (BENADryl) injection 50 mg (50 mg intravenous Given 4/30/25 1109)   prochlorperazine (Compazine) injection 10 mg (10 mg intravenous Given 4/30/25 1109)   acetaminophen (Tylenol) tablet 975 mg (975 mg oral Given 4/30/25 1109)   morphine injection 4 mg (4 mg intravenous Given 4/30/25 1257)   HYDROmorphone (Dilaudid) injection 0.5 mg (0.5 mg intravenous Given 4/30/25 1356)     CT head wo IV contrast   Final Result   No acute intracranial abnormality. Consider follow-up with MRI as   warranted.             Signed by: Onelia Rice 4/30/2025 12:35 PM   Dictation workstation:   [id number]            Procedure  Procedures       [1]   Past Medical History:  Diagnosis Date    Personal history of other diseases of the digestive system     History of esophageal reflux    Personal history of other endocrine, nutritional and metabolic disease     History of obesity    Smoker    [2]   Past Surgical History:  Procedure Laterality Date    ANKLE SURGERY  11/21/2012    Ankle Surgery    APPENDECTOMY  11/22/2013    Appendectomy    MR HEAD ANGIO WO IV CONTRAST  10/11/2018    MR HEAD ANGIO WO IV CONTRAST 10[place]11/2018 [facility] [place]    MR NECK ANGIO WO IV CONTRAST  10/11/2018    MR NECK ANGIO WO IV CONTRAST 10/11/2018 [facility] [place] [facility]   [3] No family history on file.  [4]   Social History  Tobacco Use    Smoking status: Every Day     Types: Cigarettes    Smokeless tobacco: Never   Substance Use Topics    Alcohol use: Yes     Comment: pt states \"my " "last drink was 3-4 weeks ago\"    Drug use: Never        Arnel Benjamin PA-C  04/30/25 5314    "

## 2025-04-30 NOTE — Clinical Note
Alysha Burch was seen and treated in our emergency department on 4/30/2025.  She may return to work on 05/02/2025.       If you have any questions or concerns, please don't hesitate to call.      Arnel Benjamin PA-C

## 2025-04-30 NOTE — DISCHARGE INSTRUCTIONS
Follow with pain management.  Recommend that you also follow with neurology.  Use over-the-counter medications as needed.    Be sure to take all medications, over the counter medications or prescription medications only as directed.    Be sure to follow up as directed in 1-2 days. All of the details of your follow up instructions are detailed in the follow up section of this packet.    If you are being discharged with any pains medications or muscle relaxers (norco, Vicodin, hydrocodone products, Percocet, oxycodone products, flexeril, cyclobenzaprine, robaxin, norflex, brand or generic, or any other pain controlling medications with the exception of Ibuprofen and regular Tylenol, do not drive or operate machinery, climb ladders or participate in any activity that could potentially put yourself or others at risk should you get dizzy, or be/feel impaired at all.    Return to emergency room without delay for ANY new or worsening pains or for any other symptoms or concerns. Return with worsening pains, nausea, vomiting, trouble breathing, palpitations, shortness of breath, inability to pass stool or urine, loss of control of stool or urine, any numbness or tingling (that is not normal for you), uncontrolled fevers, the passing of blood or other material in stool or urine, rashes, pains or for any other symptoms or concerns you may have. You are always welcome to return to the ER at any time for any reason or for any other concerns you may have.

## 2025-05-02 ENCOUNTER — APPOINTMENT (OUTPATIENT)
Dept: PHYSICAL THERAPY | Facility: CLINIC | Age: 50
End: 2025-05-02
Payer: MEDICARE

## 2025-05-05 ENCOUNTER — APPOINTMENT (OUTPATIENT)
Dept: PHYSICAL THERAPY | Facility: CLINIC | Age: 50
End: 2025-05-05
Payer: MEDICARE

## 2025-05-09 ENCOUNTER — APPOINTMENT (OUTPATIENT)
Dept: PHYSICAL THERAPY | Facility: CLINIC | Age: 50
End: 2025-05-09
Payer: MEDICARE

## 2025-05-12 ENCOUNTER — APPOINTMENT (OUTPATIENT)
Dept: PHYSICAL THERAPY | Facility: CLINIC | Age: 50
End: 2025-05-12
Payer: MEDICARE

## 2025-05-16 ENCOUNTER — APPOINTMENT (OUTPATIENT)
Dept: PHYSICAL THERAPY | Facility: CLINIC | Age: 50
End: 2025-05-16
Payer: MEDICARE

## 2025-05-28 ENCOUNTER — TELEPHONE (OUTPATIENT)
Dept: OPHTHALMOLOGY | Facility: CLINIC | Age: 50
End: 2025-05-28
Payer: MEDICARE

## 2025-05-28 NOTE — TELEPHONE ENCOUNTER
DR. SUNSHINE REFERRAL.   REQUESTED REFERRAL FOR SCHEDULING.     Notes given to Dr. Hall to review, please call after 3pm

## 2025-05-30 ENCOUNTER — OFFICE VISIT (OUTPATIENT)
Dept: ORTHOPEDIC SURGERY | Facility: CLINIC | Age: 50
End: 2025-05-30
Payer: MEDICARE

## 2025-05-30 DIAGNOSIS — M96.1 POST LAMINECTOMY SYNDROME: ICD-10-CM

## 2025-05-30 DIAGNOSIS — M54.16 LUMBAR RADICULOPATHY: ICD-10-CM

## 2025-05-30 DIAGNOSIS — M48.061 DEGENERATIVE LUMBAR SPINAL STENOSIS: Primary | ICD-10-CM

## 2025-05-30 PROCEDURE — 99204 OFFICE O/P NEW MOD 45 MIN: CPT | Performed by: ORTHOPAEDIC SURGERY

## 2025-05-30 PROCEDURE — 99214 OFFICE O/P EST MOD 30 MIN: CPT | Performed by: ORTHOPAEDIC SURGERY

## 2025-05-30 RX ORDER — FERROUS SULFATE 325(65) MG
325 TABLET, DELAYED RELEASE (ENTERIC COATED) ORAL
COMMUNITY

## 2025-05-30 RX ORDER — TRAZODONE HYDROCHLORIDE 100 MG/1
100 TABLET ORAL NIGHTLY
COMMUNITY

## 2025-05-30 RX ORDER — TRAZODONE HYDROCHLORIDE 50 MG/1
100 TABLET ORAL NIGHTLY
COMMUNITY

## 2025-05-30 ASSESSMENT — ENCOUNTER SYMPTOMS
LOSS OF SENSATION IN FEET: 0
OCCASIONAL FEELINGS OF UNSTEADINESS: 1
DEPRESSION: 0

## 2025-05-30 ASSESSMENT — PAIN - FUNCTIONAL ASSESSMENT: PAIN_FUNCTIONAL_ASSESSMENT: 0-10

## 2025-05-30 ASSESSMENT — PAIN DESCRIPTION - DESCRIPTORS: DESCRIPTORS: ACHING;RADIATING;BURNING

## 2025-05-30 NOTE — PROGRESS NOTES
Chief Complaint: Chronic low back pain with right greater than left leg pain.    All previous Progress Notes and imaging results related to this patients chief complaint have been reviewed in preparation for this examination.    HPI: Alysha Burch is a 50 y.o. year old female patient with recent history of long history of back problems.  In 2016 she underwent a laminectomy at L5 by  she explains that there were no complications but that she did not think that it helped her much either in the short run or subsequently in the long run.  She has aches and pains that extend into the legs.  Most recently she has had right anterior leg pain when she stands or walks too long.  She has been on disability for her back and she works part-time at a local school as a .  She has been given gabapentin which gives her some relief.  She has been in pain management and after many sessions was advised that there is nothing further they can do.  She has no bowel or bladder control issues.    Prior spine surgeries: L5 laminectomy in 2016    Physical Therapy: No  Other conservative care: Pain management  Activity modification: No  Employment: Part-time school   Exercise: She walks for 10 minutes or less.  Review of Systems    All other systems have been reviewed and are negative for complaint. All pertinent positive and negative as listed in history of present illness.    Medical History[1]     Surgical History[2]     RX Allergies[3]     Medications Ordered Prior to Encounter[4]     Tobacco: Pack a day  Anticoagulation : No    PE:   General: Patient appears  well-developed in no acute distress, Alert and Oriented x3  Psych: Pleasant mood and affect  HEENT: Extraocular muscles intact, pupils equal and round. Sclerae anicteric   Cardio: extremities warm and well perfused  Resp: unlabored symmetric breathing, no wheezing, SOB, cough.  Skin: no open wounds or rash  Musculoskeletal/Neuro Exam: Normal gait.  Heel  walk: Normal, toe walk: Normal, single-leg stance: Normal, knee bend: Normal.   No tenderness to palpation along the iliac crest spinous processes or.  Negative straight leg raise bilaterally.  No groin pain with ROM of the hip joints with IR and ER.  Trochanteric tenderness: No.  Neutral spinal balance. Lumbar extension: 10 degrees. Toe-touch to floor. Lumbar flexion painful: No. Shoulder balance: Level. Rib hump: No.    Lower extremity  Motor: Right leg with 5 out of 5 motor strength with hip flexion, knee extension, ankle dorsiflexion plantarflexion and EHL against resistance.  Left leg with 5 out of 5 motor strength with hip flexion, knee extension, ankle dorsiflexion plantarflexion EHL against resistance  Sensation to light touch intact along L2 to S1 distribution bilaterally  2+ patella and achilles reflex bilaterally.  No fasciculations, atrophy, or edema.  Normal motor tone.  Negative Babinski no clonus   Skin warm, intact.  Normal capillary refill.        Imaging reviewed:  MRI lumbar spine images performed on February 28.  She has a wide laminectomy at L5-S1 with no residual narrowing.  She has normal alignment of the lumbar spine.  She has a degenerative disc at each level.  She has fairly severe stenosis at L3-4 right worse than left, with facet synovial fluid present.  No significant other abnormality.    I explained that she previously had a laminectomy for stenosis but does not feel that it benefited her.  For exactly that reason I do not think the laminectomy alone is likely to be of value at the L3-4 level with stenosis.  Ultimately if she reaches the point where she cannot function at all, a laminectomy with a fusion with hardware would be highly recommended.  She is not at the stage where she would be a prime candidate for laminectomy with fusion.  Instead [facility] is going [facility] [employer] [facility] to use the [facility].  When she was in aquatic therapy she was feeling excellent    I also counseled her very  directly on the importance of smoking cessation            Assessment   1. Degenerative lumbar spinal stenosis        2. Lumbar radiculopathy        3. Post laminectomy syndrome            A/P: Alysha Burch is a 50 y.o. year old female patient with moderate lumbar spinal stenosis at L3-4 postlaminectomy L5-S1    Treatment or Intervention:  Exercise aquatics body mechanics smoking cessation        Follow-up if she becomes progressively more limited by her back and leg pain.                 [1]   Past Medical History:  Diagnosis Date    Personal history of other diseases of the digestive system     History of esophageal reflux    Personal history of other endocrine, nutritional and metabolic disease     History of obesity    Smoker    [2]   Past Surgical History:  Procedure Laterality Date    ANKLE SURGERY  11/21/2012    Ankle Surgery    APPENDECTOMY  11/22/2013    Appendectomy    MR HEAD ANGIO WO IV CONTRAST  10/11/2018    MR HEAD ANGIO WO IV CONTRAST 10/11/2018 [facility] [place] [facility]    MR NECK ANGIO WO IV CONTRAST  10/11/2018    MR NECK ANGIO WO IV CONTRAST 10/11/2018 [facility] [place] [facility]   [3]   Allergies  Allergen Reactions    Biaxin [Clarithromycin] Nausea/vomiting    Zithromax [Azithromycin] Myalgia   [4]   Current Outpatient Medications on File Prior to Visit   Medication Sig Dispense Refill    acetaminophen (Tylenol) 325 mg tablet Take 2 tablets (650 mg) by mouth every 4 hours if needed for moderate pain (4 - 6). 100 tablet 0    amLODIPine (Norvasc) 10 mg tablet Take 1 tablet (10 mg) by mouth once daily.      DULoxetine (Cymbalta) 60 mg DR capsule Take 2 capsules (120 mg) by mouth 2 times a day. Do not crush or chew.      ferrous sulfate 325 (65 Fe) mg EC tablet Take 1 tablet by mouth 3 times daily (morning, midday, late afternoon). Do not crush, chew, or split.      gabapentin (Neurontin) 300 mg capsule Take 1 capsule (300 mg) by mouth 3 times a day.      hydrOXYzine pamoate (Vistaril) 50 mg capsule  Take 1 capsule (50 mg) by mouth 3 times a day as needed for itching or anxiety. 30 capsule 0    rimegepant (NURTEC) 75 mg tablet,disintegrating Dissolve 1 tablet (75 mg) in the mouth every other day.      spironolactone (Aldactone) 100 mg tablet Take 1 tablet (100 mg) by mouth once daily.      sucralfate (Carafate) 1 gram tablet Take 1 tablet (1 g) by mouth 4 times a day before meals. 120 tablet 11    traZODone (Desyrel) 100 mg tablet Take 1 tablet (100 mg) by mouth once daily at bedtime.      traZODone (Desyrel) 50 mg tablet Take 2 tablets (100 mg) by mouth once daily at bedtime.      albuterol 90 mcg/actuation inhaler Inhale 2 puffs every 4 hours if needed for wheezing. 18 g 0    famotidine (Pepcid) 20 mg tablet Take 1 tablet (20 mg) by mouth 2 times a day. 60 tablet 0    loperamide (Imodium) 2 mg capsule Take 1 capsule (2 mg) by mouth 4 times a day as needed for diarrhea. (Patient not taking: Reported on 5/30/2025) 30 capsule 0    metoprolol tartrate (Lopressor) 50 mg tablet Take 1 tablet by mouth 2 times a day. (Patient not taking: Reported on 5/30/2025)      pantoprazole (ProtoNix) 40 mg EC tablet Take 1 tablet (40 mg) by mouth 2 times a day before meals. Do not crush, chew, or split. 60 tablet 0     No current facility-administered medications on file prior to visit.

## 2025-06-03 ENCOUNTER — HOSPITAL ENCOUNTER (OUTPATIENT)
Dept: RADIOLOGY | Facility: HOSPITAL | Age: 50
Discharge: HOME | End: 2025-06-03
Payer: MEDICARE

## 2025-06-03 DIAGNOSIS — G43.119 MIGRAINE WITH AURA, INTRACTABLE, WITHOUT STATUS MIGRAINOSUS: ICD-10-CM

## 2025-06-03 PROCEDURE — 70544 MR ANGIOGRAPHY HEAD W/O DYE: CPT

## 2025-06-03 PROCEDURE — 70551 MRI BRAIN STEM W/O DYE: CPT

## 2025-06-03 PROCEDURE — 70547 MR ANGIOGRAPHY NECK W/O DYE: CPT

## 2025-06-10 ENCOUNTER — DOCUMENTATION (OUTPATIENT)
Dept: PHYSICAL THERAPY | Facility: CLINIC | Age: 50
End: 2025-06-10
Payer: MEDICARE

## 2025-06-10 NOTE — PROGRESS NOTES
Physical Therapy    Discharge Summary    Name: Alysha Burch  MRN: [id number]  : 1975  Date: 06/10/25    Discharge Summary: PT    Discharge Information: Date of last visit 25 and Number of attended visits 10    Therapy Summary: Pt did not return to PT after last visit    Rehab Discharge Reason: Failed to schedule and/or keep follow-up appointment(s)

## 2025-06-19 ENCOUNTER — OFFICE VISIT (OUTPATIENT)
Dept: OPHTHALMOLOGY | Facility: CLINIC | Age: 50
End: 2025-06-19
Payer: MEDICARE

## 2025-06-19 ENCOUNTER — TELEPHONE (OUTPATIENT)
Dept: OPHTHALMOLOGY | Facility: CLINIC | Age: 50
End: 2025-06-19

## 2025-06-19 DIAGNOSIS — H25.13 AGE-RELATED NUCLEAR CATARACT OF BOTH EYES: ICD-10-CM

## 2025-06-19 DIAGNOSIS — H40.013 OPEN ANGLE WITH BORDERLINE FINDINGS, LOW RISK, BILATERAL: ICD-10-CM

## 2025-06-19 DIAGNOSIS — G43.109 MIGRAINE WITH AURA AND WITHOUT STATUS MIGRAINOSUS, NOT INTRACTABLE: Primary | ICD-10-CM

## 2025-06-19 DIAGNOSIS — H52.7 REFRACTION ERROR: ICD-10-CM

## 2025-06-19 PROCEDURE — 99214 OFFICE O/P EST MOD 30 MIN: CPT | Performed by: OPHTHALMOLOGY

## 2025-06-19 PROCEDURE — 99204 OFFICE O/P NEW MOD 45 MIN: CPT | Performed by: OPHTHALMOLOGY

## 2025-06-19 RX ORDER — PNV NO.95/FERROUS FUM/FOLIC AC 28MG-0.8MG
TABLET ORAL
COMMUNITY
End: 2025-06-19 | Stop reason: WASHOUT

## 2025-06-19 RX ORDER — GUAIFENESIN 1200 MG
TABLET, EXTENDED RELEASE 12 HR ORAL
COMMUNITY
End: 2025-06-19 | Stop reason: WASHOUT

## 2025-06-19 RX ORDER — BUSPIRONE HYDROCHLORIDE 30 MG/1
TABLET ORAL EVERY 12 HOURS
COMMUNITY
End: 2025-06-19 | Stop reason: WASHOUT

## 2025-06-19 RX ORDER — MULTIVITAMIN/IRON/FOLIC ACID 18MG-0.4MG
TABLET ORAL
COMMUNITY
End: 2025-06-19 | Stop reason: WASHOUT

## 2025-06-19 RX ORDER — DIAZEPAM 5 MG/1
TABLET ORAL
COMMUNITY

## 2025-06-19 ASSESSMENT — TONOMETRY
OD_IOP_MMHG: 19
IOP_METHOD: GOLDMANN APPLANATION
OS_IOP_MMHG: 19

## 2025-06-19 ASSESSMENT — ENCOUNTER SYMPTOMS
CONSTITUTIONAL NEGATIVE: 0
MUSCULOSKELETAL NEGATIVE: 0
NEUROLOGICAL NEGATIVE: 0
PSYCHIATRIC NEGATIVE: 0
EYES NEGATIVE: 0
GASTROINTESTINAL NEGATIVE: 0
ALLERGIC/IMMUNOLOGIC NEGATIVE: 0
CARDIOVASCULAR NEGATIVE: 0
RESPIRATORY NEGATIVE: 0
HEMATOLOGIC/LYMPHATIC NEGATIVE: 0
ENDOCRINE NEGATIVE: 0

## 2025-06-19 ASSESSMENT — REFRACTION_WEARINGRX
OS_ADD: +2.50
OS_CYLINDER: -1.50
SPECS_TYPE: PAL
OD_CYLINDER: -1.25
OD_SPHERE: +0.75
OD_ADD: +2.50
OS_AXIS: 056
OD_AXIS: 109
OS_SPHERE: +0.75

## 2025-06-19 ASSESSMENT — VISUAL ACUITY
OS_CC: 20/25
METHOD: SNELLEN - LINEAR
CORRECTION_TYPE: GLASSES
OD_CC: 20/25

## 2025-06-19 ASSESSMENT — SLIT LAMP EXAM - LIDS
COMMENTS: NORMAL
COMMENTS: NORMAL

## 2025-06-19 ASSESSMENT — PATIENT HEALTH QUESTIONNAIRE - PHQ9
1. LITTLE INTEREST OR PLEASURE IN DOING THINGS: NOT AT ALL
SUM OF ALL RESPONSES TO PHQ9 QUESTIONS 1 AND 2: 0
2. FEELING DOWN, DEPRESSED OR HOPELESS: NOT AT ALL

## 2025-06-19 ASSESSMENT — EXTERNAL EXAM - RIGHT EYE: OD_EXAM: NORMAL

## 2025-06-19 ASSESSMENT — CUP TO DISC RATIO
OD_RATIO: .5
OS_RATIO: .5

## 2025-06-19 ASSESSMENT — PAIN SCALES - GENERAL: PAINLEVEL_OUTOF10: 0-NO PAIN

## 2025-06-19 ASSESSMENT — EXTERNAL EXAM - LEFT EYE: OS_EXAM: NORMAL

## 2025-06-19 NOTE — PROGRESS NOTES
Assessment/Plan   Problem List Items Addressed This Visit       Migraine with aura and without status migrainosus, not intractable - Primary    Vision complaints consistent with migraine aura. Advised no ocular pathology otherwise and will defer to treating neurology team.          Age-related nuclear cataract of both eyes    Non significant cataract noted on exam. Discussed the natural course of cataract and may require surgery at some point in the future. Will plan to continue to monitor with serial exam.            Open angle with borderline findings, low risk, bilateral    Sounds like considered a glaucoma suspect in prior practice. IOP normal and has larger cup but round and no obvious glaucomatous concern. Would consider low risk and monitor with serial exam.          Refraction error    Good vision in prior RX.             Provided reassurance regarding above diagnoses and care received in the office visit today. Discussed outcomes and options along with the importance of treatment compliance. Understands the importance of any follow up visits. Patient instructed to call/communicate with our office if any new issues, questions, or concerns.     Will plan to see back in 1 year full or sooner PRN

## 2025-06-19 NOTE — LETTER
"June 19, 2025    Edis Juarez MD  [address]  [address]  [facility] [address]    Patient: Alysha Burch   YOB: 1975   Date of Visit: 6/19/2025       Dear Dr. Edis Juarez MD:    Thank you for referring Alysha Burch to me for evaluation. Here is my assessment and plan of care:    Assessment/Plan:  Alysha was seen today for iop dilate .  Diagnoses and all orders for this visit:  Migraine with aura and without status migrainosus, not intractable (Primary)  Age-related nuclear cataract of both eyes  Open angle with borderline findings, low risk, bilateral  Refraction error    No signs of papilledema on exam today.     Below you can find relevant pieces of the exam. If you have questions, please do not hesitate to call me. I look forward to following Alysha along with you.         Sincerely,        Kuldeep Hall MD        CC:   No Recipients         External Exam         Right Left    External Normal Normal              Slit Lamp Exam         Right Left    Lids/Lashes Normal Normal    Conjunctiva/Sclera White and quiet White and quiet    Cornea Clear Clear    Anterior Chamber Deep and quiet Deep and quiet    Iris Round and reactive Round and reactive    Lens Nuclear sclerosis Nuclear sclerosis              Fundus Exam         Right Left    Vitreous Normal Normal    Disc Normal Normal    C/D Ratio .5 .5    Macula Normal Normal    Vessels Normal Normal    Periphery Normal Normal                   <div id=\"MAIN_EXAM_REVIEWED\"></div>     "

## 2025-06-19 NOTE — PATIENT INSTRUCTIONS
Thank you so much for choosing me to provide your care today!    If you were dilated your vision may remain blurry   or light sensitive for several hours.    The nature of eye and vision problems can require frequent follow up, please make every effort to adhere to any future appointments.    If you have any issues, questions, or concerns,   please do not hesitate to reach out.    If you receive a survey in regards to your care today, please mention any exceptional care my office staff and/or technicians provided.    You can reach our office at this number:    [phone]    Please consider signing up for and utilizing [employer]!  This is the best way to directly reach me or other  providers

## 2025-06-19 NOTE — TELEPHONE ENCOUNTER
RECORD REQUEST FAXED TO [facility] PER PATIENT.   [facility] SAID THEY HAVE NO RECORD OF PATIENT.

## 2025-06-19 NOTE — ASSESSMENT & PLAN NOTE
Sounds like considered a glaucoma suspect in prior practice. IOP normal and has larger cup but round and no obvious glaucomatous concern. Would consider low risk and monitor with serial exam.

## 2025-06-19 NOTE — ASSESSMENT & PLAN NOTE
Vision complaints consistent with migraine aura. Advised no ocular pathology otherwise and will defer to treating neurology team.

## 2025-07-17 ENCOUNTER — APPOINTMENT (OUTPATIENT)
Dept: SURGERY | Facility: CLINIC | Age: 50
End: 2025-07-17
Payer: MEDICARE

## 2026-06-24 ENCOUNTER — APPOINTMENT (OUTPATIENT)
Dept: OPHTHALMOLOGY | Facility: CLINIC | Age: 51
End: 2026-06-24
Payer: MEDICARE